# Patient Record
Sex: MALE | Race: WHITE | HISPANIC OR LATINO | Employment: FULL TIME | URBAN - METROPOLITAN AREA
[De-identification: names, ages, dates, MRNs, and addresses within clinical notes are randomized per-mention and may not be internally consistent; named-entity substitution may affect disease eponyms.]

---

## 2020-07-21 ENCOUNTER — TELEPHONE (OUTPATIENT)
Dept: RHEUMATOLOGY | Facility: CLINIC | Age: 60
End: 2020-07-21

## 2020-07-21 NOTE — TELEPHONE ENCOUNTER
Called patient back and he wanted a appointment as a new patient to see Dr. Barron as soon as possible, I voiced to him that we would need a referral from PCP but at this time Dr. Barron is out on a personal emergency and if he need to be seen soon then he can get a referral sent to another rheumatologist. Patient voiced understanding.

## 2021-12-23 ENCOUNTER — OFFICE VISIT (OUTPATIENT)
Dept: RHEUMATOLOGY | Facility: CLINIC | Age: 61
End: 2021-12-23
Payer: COMMERCIAL

## 2021-12-23 VITALS
SYSTOLIC BLOOD PRESSURE: 155 MMHG | BODY MASS INDEX: 23.56 KG/M2 | DIASTOLIC BLOOD PRESSURE: 86 MMHG | HEIGHT: 64 IN | WEIGHT: 138 LBS | RESPIRATION RATE: 16 BRPM | OXYGEN SATURATION: 98 % | HEART RATE: 74 BPM

## 2021-12-23 DIAGNOSIS — K90.0 CELIAC DISEASE: ICD-10-CM

## 2021-12-23 DIAGNOSIS — Z11.59 ENCOUNTER FOR HEPATITIS C SCREENING TEST FOR LOW RISK PATIENT: ICD-10-CM

## 2021-12-23 DIAGNOSIS — Z11.1 SCREENING-PULMONARY TB: ICD-10-CM

## 2021-12-23 DIAGNOSIS — G56.02 CARPAL TUNNEL SYNDROME OF LEFT WRIST: ICD-10-CM

## 2021-12-23 DIAGNOSIS — M79.10 MYALGIA: ICD-10-CM

## 2021-12-23 DIAGNOSIS — M15.8 OTHER OSTEOARTHRITIS INVOLVING MULTIPLE JOINTS: ICD-10-CM

## 2021-12-23 DIAGNOSIS — Z13.89 SCREENING FOR RHEUMATIC DISORDER: ICD-10-CM

## 2021-12-23 DIAGNOSIS — M25.50 POLYARTHRALGIA: Primary | ICD-10-CM

## 2021-12-23 DIAGNOSIS — M51.36 LUMBAR DEGENERATIVE DISC DISEASE: ICD-10-CM

## 2021-12-23 PROCEDURE — 3077F PR MOST RECENT SYSTOLIC BLOOD PRESSURE >= 140 MM HG: ICD-10-PCS | Mod: CPTII,S$GLB,, | Performed by: INTERNAL MEDICINE

## 2021-12-23 PROCEDURE — 4010F PR ACE/ARB THEARPY RXD/TAKEN: ICD-10-PCS | Mod: CPTII,S$GLB,, | Performed by: INTERNAL MEDICINE

## 2021-12-23 PROCEDURE — 1159F PR MEDICATION LIST DOCUMENTED IN MEDICAL RECORD: ICD-10-PCS | Mod: CPTII,S$GLB,, | Performed by: INTERNAL MEDICINE

## 2021-12-23 PROCEDURE — 3008F BODY MASS INDEX DOCD: CPT | Mod: CPTII,S$GLB,, | Performed by: INTERNAL MEDICINE

## 2021-12-23 PROCEDURE — 99204 PR OFFICE/OUTPT VISIT, NEW, LEVL IV, 45-59 MIN: ICD-10-PCS | Mod: S$GLB,,, | Performed by: INTERNAL MEDICINE

## 2021-12-23 PROCEDURE — 1160F RVW MEDS BY RX/DR IN RCRD: CPT | Mod: CPTII,S$GLB,, | Performed by: INTERNAL MEDICINE

## 2021-12-23 PROCEDURE — 3079F PR MOST RECENT DIASTOLIC BLOOD PRESSURE 80-89 MM HG: ICD-10-PCS | Mod: CPTII,S$GLB,, | Performed by: INTERNAL MEDICINE

## 2021-12-23 PROCEDURE — 3079F DIAST BP 80-89 MM HG: CPT | Mod: CPTII,S$GLB,, | Performed by: INTERNAL MEDICINE

## 2021-12-23 PROCEDURE — 3008F PR BODY MASS INDEX (BMI) DOCUMENTED: ICD-10-PCS | Mod: CPTII,S$GLB,, | Performed by: INTERNAL MEDICINE

## 2021-12-23 PROCEDURE — 1159F MED LIST DOCD IN RCRD: CPT | Mod: CPTII,S$GLB,, | Performed by: INTERNAL MEDICINE

## 2021-12-23 PROCEDURE — 3077F SYST BP >= 140 MM HG: CPT | Mod: CPTII,S$GLB,, | Performed by: INTERNAL MEDICINE

## 2021-12-23 PROCEDURE — 1160F PR REVIEW ALL MEDS BY PRESCRIBER/CLIN PHARMACIST DOCUMENTED: ICD-10-PCS | Mod: CPTII,S$GLB,, | Performed by: INTERNAL MEDICINE

## 2021-12-23 PROCEDURE — 99204 OFFICE O/P NEW MOD 45 MIN: CPT | Mod: S$GLB,,, | Performed by: INTERNAL MEDICINE

## 2021-12-23 PROCEDURE — 4010F ACE/ARB THERAPY RXD/TAKEN: CPT | Mod: CPTII,S$GLB,, | Performed by: INTERNAL MEDICINE

## 2021-12-23 RX ORDER — OMEPRAZOLE 40 MG/1
40 CAPSULE, DELAYED RELEASE ORAL DAILY
COMMUNITY

## 2021-12-23 RX ORDER — METHOCARBAMOL 500 MG/1
TABLET, FILM COATED ORAL
COMMUNITY
Start: 2021-12-13

## 2021-12-23 RX ORDER — DICLOFENAC SODIUM 10 MG/G
GEL TOPICAL
COMMUNITY
Start: 2021-12-06

## 2021-12-23 RX ORDER — ROPINIROLE 0.5 MG/1
TABLET, FILM COATED ORAL
COMMUNITY

## 2021-12-23 RX ORDER — MECLIZINE HYDROCHLORIDE 25 MG/1
TABLET ORAL
COMMUNITY
Start: 2021-07-02

## 2021-12-23 RX ORDER — NAPROXEN 500 MG/1
500 TABLET ORAL 2 TIMES DAILY PRN
Qty: 60 TABLET | Refills: 2 | Status: SHIPPED | OUTPATIENT
Start: 2021-12-23 | End: 2022-03-30 | Stop reason: SDUPTHER

## 2021-12-23 RX ORDER — NAPROXEN 500 MG/1
TABLET ORAL
COMMUNITY
Start: 2021-06-29 | End: 2021-12-23 | Stop reason: SDUPTHER

## 2021-12-23 RX ORDER — AMLODIPINE BESYLATE 10 MG/1
TABLET ORAL
COMMUNITY
Start: 2021-07-02

## 2021-12-23 RX ORDER — TRAMADOL HYDROCHLORIDE 50 MG/1
100 TABLET ORAL 3 TIMES DAILY PRN
Qty: 90 TABLET | Refills: 1 | Status: SHIPPED | OUTPATIENT
Start: 2021-12-23 | End: 2022-01-25 | Stop reason: SDUPTHER

## 2021-12-23 RX ORDER — LISINOPRIL 40 MG/1
TABLET ORAL
COMMUNITY
Start: 2021-11-08

## 2021-12-23 RX ORDER — PRAVASTATIN SODIUM 20 MG/1
TABLET ORAL
COMMUNITY
Start: 2021-11-08 | End: 2023-04-25 | Stop reason: ALTCHOICE

## 2022-01-11 NOTE — PROGRESS NOTES
Subjective:      Patient ID: Kayode Merrill is a 62 y.o. male.    Chief Complaint: Disease Management    HPI:   Includes joint pain with subjective swelling.   Antecedent event includes: None;  Pain location includes: joints;  Gradual onset; beginning >years ago;  Constant ache, Moderate in severity,   Lasting >hours, Worse during the daytime;   Improved with rest, medication, change in position and none;   Worsened with activity and overuse;     Review of Systems   Constitutional: Positive for fatigue. Negative for chills and fever.   HENT: Positive for mouth dryness and tinnitus. Negative for nasal congestion, ear pain, hearing loss, mouth sores, nosebleeds and trouble swallowing.    Eyes: Positive for visual disturbance. Negative for photophobia, pain, redness and eye dryness.   Respiratory: Positive for shortness of breath. Negative for cough.    Cardiovascular: Positive for chest pain, palpitations and leg swelling.   Gastrointestinal: Negative for abdominal distention, abdominal pain, blood in stool, constipation, diarrhea, rectal pain, vomiting and fecal incontinence.   Endocrine: Negative for polydipsia and polyuria.   Genitourinary: Negative for bladder incontinence, dysuria, enuresis, genital sores and hematuria.   Musculoskeletal: Positive for arthralgias, joint swelling and myalgias.   Integumentary:  Positive for rash. Negative for wound and mole/lesion.   Neurological: Positive for dizziness, weakness and headaches.   Hematological: Negative for adenopathy. Does not bruise/bleed easily.   Psychiatric/Behavioral: Positive for agitation, dysphoric mood and sleep disturbance. The patient is nervous/anxious.       Past Medical History:   Diagnosis Date    Acid reflux     Arthritis     Carpal tunnel syndrome     Celiac disease     Dry mouth     Heart murmur     Hyperlipidemia     Hypertension      Past Surgical History:   Procedure Laterality Date    COLONOSCOPY      EGD, WITH BALLOON DILATION    "   MOUTH SURGERY      ROTATOR CUFF REPAIR      TRIGGER FINGER RELEASE        See the Assessment/Plan for further characterization of the HPI, ROS, Medical, Surgical, Family, and Social Histories including Tobacco use, Meds; all of which has been reviewed in Epic.    Medication List with Changes/Refills   Current Medications    AMLODIPINE (NORVASC) 10 MG TABLET        DICLOFENAC SODIUM (VOLTAREN) 1 % GEL        LISINOPRIL (PRINIVIL,ZESTRIL) 40 MG TABLET        MECLIZINE (ANTIVERT) 25 MG TABLET        METHOCARBAMOL (ROBAXIN) 500 MG TAB        NAPROXEN (NAPROSYN) 500 MG TABLET    Take 1 tablet (500 mg total) by mouth 2 (two) times daily as needed (pain).    OMEPRAZOLE (PRILOSEC) 40 MG CAPSULE    Take 40 mg by mouth once daily.    PRAVASTATIN (PRAVACHOL) 20 MG TABLET        ROPINIROLE (REQUIP) 0.5 MG TABLET    ropinirole 0.5 mg tablet   Take 1 tablet 3 times a day by oral route.   Changed and/or Refilled Medications    Modified Medication Previous Medication    TRAMADOL (ULTRAM) 50 MG TABLET traMADoL (ULTRAM) 50 mg tablet       Take 2 tablets (100 mg total) by mouth 3 (three) times daily as needed for Pain (instead of oxycodone).    Take 2 tablets (100 mg total) by mouth 3 (three) times daily as needed for Pain (instead of oxycodone).         Objective:   /88   Pulse 63   Resp 16   Ht 5' 4" (1.626 m)   Wt 60.3 kg (133 lb)   SpO2 98%   BMI 22.83 kg/m²   Physical Exam  Non-toxic appearance. No distress.   Normocephalic and atraumatic.   External ears normal.    Conjunctivae and EOM are normal. No scleral icterus.   RRR, No friction rub; palpable distal pulses.    No tachypnea or signs of respiratory distress.   Neurological: Oriented. Normal thought content. No cranial nerve deficit. Strength is grossly normal without focal deficit.  Skin is warm. No pallor.   Musculoskeletal: see below for further input. No overt synovitis.     No image results found.    No visits with results within 1 Year(s) from this " visit.   Latest known visit with results is:   No results found for any previous visit.        All of the data above and below has been reviewed by myself and any further interpretations will be reflected in the assessment and plan.   The data includes review of external notes, and independent interpretation of lab results, x-rays, and imaging reports.  Active inflammatory arthritis is an illness that poses a threat to bodily function and even a threat to life in some cases.  Drug therapy to treat inflammatory arthritis usually requires intensive monitoring for toxicity.    Review of patient's allergies indicates:  No Known Allergies  Assessment/Plan:       Prev noted/prior plan:   (No overt synovitis  DJD generalized is impressive on exam  Heart murmur     Verbal education including long discussion of what is causing his pain, what else we are looking into, and that there won't be a medicine that will mask the pain any more than the prior narcotics oxycodone 10mg tid he had been on     He should consider also working with Pain Management if needing pain management with high dose narcotics     He tells me he had to wait to long in waiting room to see Dr. Krueger is why he left     I can resume tramadol 100mg tid prn moderate pain for now     He should also resume  Naprosyn 500mg bid prn unless there a reason he cannot use?     Blood work to further evaluate     Revisit 4 weeks     Contact us prn)    This visit:    Interim lab 1/13/22:        C3 97  C4 18    SPEP alpha 2 little low 0.52  Ig levels normal    WBC 4.8; Hgb 15.4; plt 324    Creat 0.74; alb 4.6    ESR 5 CRP <3    GEORGE neg RF neg CCP neg HLAB27 neg HepBsAgsAbnegcoreAb neg HCV neg uric acid 7    2022 TB negative      tramadol 100mg tid prn moderate pain for now     He should also resume  Naprosyn 500mg bid prn unless there a reason he cannot use? Prev noted    He has interim symptoms of inflammatory arthritis    I see that he also got some interim  hydrocodone noted    He may be working with a different Orthopedist now;   He tells me he had declined further management including possible knee surgery with Dr. Diego, but     since working with a new doctor    He had a left shoulder injection and may have a carpal tunnel injection in the future    No overt synovitis  DJD impressive including hands and knees   Rotator cuff    Went over lab including RF neg    I agree with most of Dr. Krueger's impression    I am not seeing active inflammatory arthritis or connective tissue disease on exam or lab noted    Verbal education    We talked and mostly listened for while today; I feel for him and I am impressed he has been able to wean the narcotics    He does have a lot of Osteoarthritis; I don't see active inflammatory arthritis    I am okay with giving him the tramadol with caveats    tramadol 100mg tid prn moderate pain for now    It sounds like he is also working with Orthopedics;   He is not sure but the doctor did give him a shoulder injection noted    xrays to further evaluate    Revisit 2 months with lab 2 weeks prior    Contact us prn       GEORGE neg CCP neg  GEORGE neg RF neg CCP neg HLAB27 neg HepBsAgsAbnegcoreAb neg HCV neg uric acid 7 2022 TB negative    There is some history to suggest inflammatory arthritis.     Celiac disease per records from PCP     There is also some mechanical and neurogenic pain.     Recently found to have left Carpal tunnel on EMG/NCS 12/13/21 he brought; he can update Orthopedist if not already     Right ring finger surgery  Right shoulder surgery Dr. Diego     Severe DJD in knees left worse than right     Has been managing with:     Naprosyn 500mg bid he tells me he rather does not use     Robaxin he tells me he rather does not use and plan to clarify his med list with Tori     Voltaren gel     Prior oxycodone 10mg tid that he was still getting up until a couple of months ago    Prev noted/prior plan:   (No overt synovitis  DJD  generalized is impressive on exam  Heart murmur     Verbal education including long discussion of what is causing his pain, what else we are looking into, and that there won't be a medicine that will mask the pain any more than the prior narcotics oxycodone 10mg tid he had been on     He should consider also working with Pain Management if needing pain management with high dose narcotics     He tells me he had to wait to long in waiting room to see Dr. Krueger is why he left     I can resume tramadol 100mg tid prn moderate pain for now     He should also resume  Naprosyn 500mg bid prn unless there a reason he cannot use?     Blood work to further evaluate     Revisit 4 weeks     Contact us prn)     Has been working with:     Celiac disease followed by GI     Aortic stenosis Dr. [facility]      Records Dr. Krueger some of note 5/26/21: OA shoulder; lumbar spine pain; failed NSAIDs; Opiates help ADL; improvement with steroid injection; A/P: RF+ mild; does not suggest active rheumatoid arthritis; OA; DJD hips, hands, spine; Oxycodone 10mg tid; Lab; revisit 1 month     8/6/21 xray reports: right shoulder severe DJD post op changes; severe DJD left knee worse than right 3 compartment       7/31/20 CT abd/pelvis: mild fatty infiltration liver; cysts versus hemangiomas 1.1 cm left hepatic lobe; thick sigmoid colon may be secondary to prior colitis; recommend MRI multiphase to further evaluate     Review of medical records as stated above.  Lab +/- imaging and other ordered diagnostic studies to further evaluate.  See the orders associated with this note visit.  Medications as prescribed as tolerated.    Education including verbal and those noted in the patient instructions.  Revisit as scheduled and call prn.    The following diagnoses influence medical decision making and/or need further workup including the orders listed below:    Polyarthralgia  -     CBC Auto Differential; Future; Expected date: 03/07/2022  -      CK; Future; Expected date: 03/07/2022  -     Comprehensive Metabolic Panel; Future; Expected date: 03/07/2022  -     C-Reactive Protein; Future; Expected date: 03/07/2022  -     Sedimentation rate; Future; Expected date: 03/07/2022  -     Urinalysis; Future; Expected date: 03/07/2022  -     X-Ray Hand 3 View Bilateral; Future; Expected date: 01/25/2022  -     X-Ray Hips Bilateral 2 View Inc AP Pelvis; Future; Expected date: 01/25/2022  -     X-Ray Chest PA And Lateral; Future; Expected date: 01/25/2022  -     X-Ray Cervical Spine AP And Lateral; Future; Expected date: 01/25/2022  -     X-ray AP Standing Knees with Both Latera; Future; Expected date: 01/25/2022  -     X-Ray Foot 2 View Bilateral; Future; Expected date: 01/25/2022  -     X-Ray Lumbar Spine AP And Lateral; Future; Expected date: 01/25/2022  -     traMADoL (ULTRAM) 50 mg tablet; Take 2 tablets (100 mg total) by mouth 3 (three) times daily as needed for Pain (instead of oxycodone).  Dispense: 180 tablet; Refill: 1    Other osteoarthritis involving multiple joints  -     CBC Auto Differential; Future; Expected date: 03/07/2022  -     CK; Future; Expected date: 03/07/2022  -     Comprehensive Metabolic Panel; Future; Expected date: 03/07/2022  -     C-Reactive Protein; Future; Expected date: 03/07/2022  -     Sedimentation rate; Future; Expected date: 03/07/2022  -     Urinalysis; Future; Expected date: 03/07/2022  -     X-Ray Hand 3 View Bilateral; Future; Expected date: 01/25/2022  -     X-Ray Hips Bilateral 2 View Inc AP Pelvis; Future; Expected date: 01/25/2022  -     X-Ray Chest PA And Lateral; Future; Expected date: 01/25/2022  -     X-Ray Cervical Spine AP And Lateral; Future; Expected date: 01/25/2022  -     X-ray AP Standing Knees with Both Latera; Future; Expected date: 01/25/2022  -     X-Ray Foot 2 View Bilateral; Future; Expected date: 01/25/2022  -     X-Ray Lumbar Spine AP And Lateral; Future; Expected date: 01/25/2022  -     traMADoL  (ULTRAM) 50 mg tablet; Take 2 tablets (100 mg total) by mouth 3 (three) times daily as needed for Pain (instead of oxycodone).  Dispense: 180 tablet; Refill: 1    Lumbar degenerative disc disease  -     CBC Auto Differential; Future; Expected date: 03/07/2022  -     CK; Future; Expected date: 03/07/2022  -     Comprehensive Metabolic Panel; Future; Expected date: 03/07/2022  -     C-Reactive Protein; Future; Expected date: 03/07/2022  -     Sedimentation rate; Future; Expected date: 03/07/2022  -     Urinalysis; Future; Expected date: 03/07/2022  -     X-Ray Lumbar Spine AP And Lateral; Future; Expected date: 01/25/2022  -     traMADoL (ULTRAM) 50 mg tablet; Take 2 tablets (100 mg total) by mouth 3 (three) times daily as needed for Pain (instead of oxycodone).  Dispense: 180 tablet; Refill: 1    Celiac disease  -     CBC Auto Differential; Future; Expected date: 03/07/2022  -     CK; Future; Expected date: 03/07/2022  -     Comprehensive Metabolic Panel; Future; Expected date: 03/07/2022  -     C-Reactive Protein; Future; Expected date: 03/07/2022  -     Sedimentation rate; Future; Expected date: 03/07/2022  -     Urinalysis; Future; Expected date: 03/07/2022    Myalgia  -     CBC Auto Differential; Future; Expected date: 03/07/2022  -     CK; Future; Expected date: 03/07/2022  -     Comprehensive Metabolic Panel; Future; Expected date: 03/07/2022  -     C-Reactive Protein; Future; Expected date: 03/07/2022  -     Sedimentation rate; Future; Expected date: 03/07/2022  -     Urinalysis; Future; Expected date: 03/07/2022    Screening for rheumatic disorder  -     CBC Auto Differential; Future; Expected date: 03/07/2022  -     CK; Future; Expected date: 03/07/2022  -     Comprehensive Metabolic Panel; Future; Expected date: 03/07/2022  -     C-Reactive Protein; Future; Expected date: 03/07/2022  -     Sedimentation rate; Future; Expected date: 03/07/2022  -     Urinalysis; Future; Expected date: 03/07/2022  -     X-Ray  Hand 3 View Bilateral; Future; Expected date: 01/25/2022  -     X-Ray Hips Bilateral 2 View Inc AP Pelvis; Future; Expected date: 01/25/2022  -     X-Ray Chest PA And Lateral; Future; Expected date: 01/25/2022  -     X-Ray Cervical Spine AP And Lateral; Future; Expected date: 01/25/2022  -     X-ray AP Standing Knees with Both Latera; Future; Expected date: 01/25/2022  -     X-Ray Foot 2 View Bilateral; Future; Expected date: 01/25/2022  -     X-Ray Lumbar Spine AP And Lateral; Future; Expected date: 01/25/2022    Screening-pulmonary TB  -     CBC Auto Differential; Future; Expected date: 03/07/2022  -     CK; Future; Expected date: 03/07/2022  -     Comprehensive Metabolic Panel; Future; Expected date: 03/07/2022  -     C-Reactive Protein; Future; Expected date: 03/07/2022  -     Sedimentation rate; Future; Expected date: 03/07/2022  -     Urinalysis; Future; Expected date: 03/07/2022  -     X-Ray Chest PA And Lateral; Future; Expected date: 01/25/2022      Follow up in about 2 months (around 3/25/2022).     Graham Zheng MD

## 2022-01-25 ENCOUNTER — OFFICE VISIT (OUTPATIENT)
Dept: RHEUMATOLOGY | Facility: CLINIC | Age: 62
End: 2022-01-25
Payer: COMMERCIAL

## 2022-01-25 VITALS
OXYGEN SATURATION: 98 % | BODY MASS INDEX: 22.71 KG/M2 | DIASTOLIC BLOOD PRESSURE: 88 MMHG | HEIGHT: 64 IN | SYSTOLIC BLOOD PRESSURE: 124 MMHG | WEIGHT: 133 LBS | HEART RATE: 63 BPM | RESPIRATION RATE: 16 BRPM

## 2022-01-25 DIAGNOSIS — M51.36 LUMBAR DEGENERATIVE DISC DISEASE: ICD-10-CM

## 2022-01-25 DIAGNOSIS — Z11.1 SCREENING-PULMONARY TB: ICD-10-CM

## 2022-01-25 DIAGNOSIS — K90.0 CELIAC DISEASE: ICD-10-CM

## 2022-01-25 DIAGNOSIS — Z13.89 SCREENING FOR RHEUMATIC DISORDER: ICD-10-CM

## 2022-01-25 DIAGNOSIS — M15.8 OTHER OSTEOARTHRITIS INVOLVING MULTIPLE JOINTS: ICD-10-CM

## 2022-01-25 DIAGNOSIS — M79.10 MYALGIA: ICD-10-CM

## 2022-01-25 DIAGNOSIS — M25.50 POLYARTHRALGIA: Primary | ICD-10-CM

## 2022-01-25 PROCEDURE — 1160F PR REVIEW ALL MEDS BY PRESCRIBER/CLIN PHARMACIST DOCUMENTED: ICD-10-PCS | Mod: CPTII,S$GLB,, | Performed by: INTERNAL MEDICINE

## 2022-01-25 PROCEDURE — 3074F SYST BP LT 130 MM HG: CPT | Mod: CPTII,S$GLB,, | Performed by: INTERNAL MEDICINE

## 2022-01-25 PROCEDURE — 99214 PR OFFICE/OUTPT VISIT, EST, LEVL IV, 30-39 MIN: ICD-10-PCS | Mod: S$GLB,,, | Performed by: INTERNAL MEDICINE

## 2022-01-25 PROCEDURE — 3008F PR BODY MASS INDEX (BMI) DOCUMENTED: ICD-10-PCS | Mod: CPTII,S$GLB,, | Performed by: INTERNAL MEDICINE

## 2022-01-25 PROCEDURE — 3008F BODY MASS INDEX DOCD: CPT | Mod: CPTII,S$GLB,, | Performed by: INTERNAL MEDICINE

## 2022-01-25 PROCEDURE — 3079F PR MOST RECENT DIASTOLIC BLOOD PRESSURE 80-89 MM HG: ICD-10-PCS | Mod: CPTII,S$GLB,, | Performed by: INTERNAL MEDICINE

## 2022-01-25 PROCEDURE — 1159F PR MEDICATION LIST DOCUMENTED IN MEDICAL RECORD: ICD-10-PCS | Mod: CPTII,S$GLB,, | Performed by: INTERNAL MEDICINE

## 2022-01-25 PROCEDURE — 99214 OFFICE O/P EST MOD 30 MIN: CPT | Mod: S$GLB,,, | Performed by: INTERNAL MEDICINE

## 2022-01-25 PROCEDURE — 1160F RVW MEDS BY RX/DR IN RCRD: CPT | Mod: CPTII,S$GLB,, | Performed by: INTERNAL MEDICINE

## 2022-01-25 PROCEDURE — 3079F DIAST BP 80-89 MM HG: CPT | Mod: CPTII,S$GLB,, | Performed by: INTERNAL MEDICINE

## 2022-01-25 PROCEDURE — 3074F PR MOST RECENT SYSTOLIC BLOOD PRESSURE < 130 MM HG: ICD-10-PCS | Mod: CPTII,S$GLB,, | Performed by: INTERNAL MEDICINE

## 2022-01-25 PROCEDURE — 1159F MED LIST DOCD IN RCRD: CPT | Mod: CPTII,S$GLB,, | Performed by: INTERNAL MEDICINE

## 2022-01-25 RX ORDER — TRAMADOL HYDROCHLORIDE 50 MG/1
100 TABLET ORAL 3 TIMES DAILY PRN
Qty: 180 TABLET | Refills: 1 | Status: SHIPPED | OUTPATIENT
Start: 2022-01-25 | End: 2022-03-31 | Stop reason: SDUPTHER

## 2022-03-17 NOTE — PROGRESS NOTES
Subjective:      Patient ID: Kayode Merrill is a 62 y.o. male.    Chief Complaint: Disease Management    HPI:   Includes joint pain with subjective swelling.   Antecedent event includes: None;  Pain location includes: left shoulder, left hand and right feet;  Gradual onset; beginning >years ago;  Constant ache, dull, sharp, burning and numbness, Moderate in severity,   Lasting >hours, Worse at night time;   Improved with change in position;   Worsened with overuse;     Review of Systems   Constitutional: Positive for fatigue. Negative for chills and fever.   HENT: Positive for hearing loss. Negative for nasal congestion, ear pain, mouth dryness, mouth sores, nosebleeds and trouble swallowing.    Eyes: Negative for photophobia, pain, redness, visual disturbance and eye dryness.   Respiratory: Positive for shortness of breath. Negative for cough.    Cardiovascular: Positive for palpitations. Negative for chest pain and leg swelling.   Gastrointestinal: Negative for abdominal distention, abdominal pain, blood in stool, constipation, diarrhea, rectal pain, vomiting and fecal incontinence.   Endocrine: Negative for polydipsia and polyuria.   Genitourinary: Negative for bladder incontinence, dysuria, enuresis, genital sores and hematuria.   Musculoskeletal: Positive for arthralgias, joint swelling and myalgias.   Integumentary:  Positive for rash. Negative for wound and mole/lesion.   Neurological: Positive for dizziness, weakness, numbness, headaches and memory loss.   Hematological: Negative for adenopathy. Does not bruise/bleed easily.   Psychiatric/Behavioral: Positive for dysphoric mood and sleep disturbance. The patient is nervous/anxious.       Past Medical History:   Diagnosis Date    Acid reflux     Arthritis     Carpal tunnel syndrome     Celiac disease     Dry mouth     Heart murmur     Hyperlipidemia     Hypertension      Past Surgical History:   Procedure Laterality Date    COLONOSCOPY      EGD,  "WITH BALLOON DILATION      MOUTH SURGERY      ROTATOR CUFF REPAIR      TRIGGER FINGER RELEASE        See the Assessment/Plan for further characterization of the HPI, ROS, Medical, Surgical, Family, and Social Histories including Tobacco use, Meds; all of which has been reviewed in Epic.    Medication List with Changes/Refills   Current Medications    AMLODIPINE (NORVASC) 10 MG TABLET        DICLOFENAC SODIUM (VOLTAREN) 1 % GEL        LISINOPRIL (PRINIVIL,ZESTRIL) 40 MG TABLET        MECLIZINE (ANTIVERT) 25 MG TABLET        METHOCARBAMOL (ROBAXIN) 500 MG TAB        OMEPRAZOLE (PRILOSEC) 40 MG CAPSULE    Take 40 mg by mouth once daily.    PRAVASTATIN (PRAVACHOL) 20 MG TABLET        ROPINIROLE (REQUIP) 0.5 MG TABLET    ropinirole 0.5 mg tablet   Take 1 tablet 3 times a day by oral route.   Changed and/or Refilled Medications    Modified Medication Previous Medication    NAPROXEN (NAPROSYN) 500 MG TABLET naproxen (NAPROSYN) 500 MG tablet       Take 1 tablet (500 mg total) by mouth 2 (two) times daily as needed (pain).    Take 1 tablet (500 mg total) by mouth 2 (two) times daily as needed (pain).    TRAMADOL (ULTRAM) 50 MG TABLET traMADoL (ULTRAM) 50 mg tablet       Take 2 tablets (100 mg total) by mouth 3 (three) times daily as needed for Pain (instead of oxycodone).    Take 2 tablets (100 mg total) by mouth 3 (three) times daily as needed for Pain (instead of oxycodone).         Objective:   BP (!) 144/88   Pulse 82   Resp 16   Ht 5' 4" (1.626 m)   Wt 60.3 kg (133 lb)   SpO2 98%   BMI 22.83 kg/m²   Physical Exam  Non-toxic appearance. No distress.   Normocephalic and atraumatic.   External ears normal.    Conjunctivae and EOM are normal. No scleral icterus.   RRR, No friction rub; palpable distal pulses.    No tachypnea or signs of respiratory distress. CTAB.  Abdominal: No guarding or rebound tenderness.   Neurological: Oriented. Normal thought content. No cranial nerve deficit. Strength is grossly normal " without focal deficit.  Skin is warm. No pallor.   Musculoskeletal: DJD. see below for further input. No overt synovitis.     No image results found.    No visits with results within 1 Year(s) from this visit.   Latest known visit with results is:   No results found for any previous visit.        All of the data above and below has been reviewed by myself and any further interpretations will be reflected in the assessment and plan.   The data includes review of external notes, and independent interpretation of lab results, x-rays, and imaging reports.  Active inflammatory arthritis is an illness that poses a threat to bodily function and even a threat to life in some cases.  Drug therapy to treat inflammatory arthritis usually requires intensive monitoring for toxicity.    Review of patient's allergies indicates:  No Known Allergies  Assessment/Plan:       Prev noted/prior plan:   (No overt synovitis  DJD generalized is impressive on exam  Heart murmur     Verbal education including long discussion of what is causing his pain, what else we are looking into, and that there won't be a medicine that will mask the pain any more than the prior narcotics oxycodone 10mg tid he had been on     He should consider also working with Pain Management if needing pain management with high dose narcotics     He tells me he had to wait to long in waiting room to see Dr. Krueger is why he left     I can resume tramadol 100mg tid prn moderate pain for now     He should also resume  Naprosyn 500mg bid prn unless there a reason he cannot use?     Blood work to further evaluate     Revisit 4 weeks     Contact us prn)     Last visit:     Interim lab 1/13/22:          C3 97  C4 18     SPEP alpha 2 little low 0.52  Ig levels normal     WBC 4.8; Hgb 15.4; plt 324     Creat 0.74; alb 4.6     ESR 5 CRP <3     GEORGE neg RF neg CCP neg HLAB27 neg HepBsAgsAbnegcoreAb neg HCV neg uric acid 7     2022 TB negative        tramadol 100mg tid prn  moderate pain for now     He should also resume  Naprosyn 500mg bid prn unless there a reason he cannot use? Prev noted     He has interim symptoms of inflammatory arthritis     I see that he also got some interim hydrocodone noted     He may be working with a different Orthopedist now;   He tells me he had declined further management including possible knee surgery with Dr. Diego, but      since working with a new doctor     He had a left shoulder injection and may have a carpal tunnel injection in the future     No overt synovitis  DJD impressive including hands and knees   Rotator cuff     Went over lab including RF neg     I agree with most of Dr. Krueger's impression     I am not seeing active inflammatory arthritis or connective tissue disease on exam or lab noted     Verbal education     We talked and mostly listened for while today; I feel for him and I am impressed he has been able to wean the narcotics     He does have a lot of Osteoarthritis; I don't see active inflammatory arthritis     I am okay with giving him the tramadol with caveats     tramadol 100mg tid prn moderate pain for now     It sounds like he is also working with Orthopedics;   He is not sure but the doctor did give him a shoulder injection noted     xrays to further evaluate     Revisit 2 months with lab 2 weeks prior     Contact us prn     This visit:    Interim lab 3/15/22:    Creat 0.64; alb 4.8    WBC 4.8; Hgb 15; plt 327    UA SG 1.015 rare cell trace ketones      ESR<1; CRP <3      Interim xray reports: he held off due to cost but should proceed in future when able    Naprosyn 500mg bid prn     tramadol 100mg tid prn moderate pain for now    He has some interim symptoms inflammatory arthritis also symptoms of carpal tunnel    He tells me he has also been working with Orthopedics and a hand specialist and may have left shoulder surgery in the future    He also had some xrays with them and he will plan to have them forward to  us and we will seek if not sent next visit  He should also get the xrays at his convenience    He has made some dietary changes trying to be more gluten free which is great    He tells me he also went to ED interim for pulled muscle in stomach after lifting something heavy that is better noted    No overt synovitis  DJD is impressive in hands on exam  Rotator cuff    Went over interim lab     I am not seeing active inflammatory arthritis or connective tissue disease on exam or lab noted.    We will observe otherwise and he will plan to call if joint swelling recurs;     Xray when able and send us other    Refilled naprosyn and tramadol    Revisit 6 months with lab 2 weeks prior      Contact us prn       GEORGE neg CCP neg  GEORGE neg RF neg CCP neg HLAB27 neg HepBsAgsAbnegcoreAb neg HCV neg uric acid 7 2022 TB negative     There is some history to suggest inflammatory arthritis.     Celiac disease per records from PCP     There is also some mechanical and neurogenic pain.     Recently found to have left Carpal tunnel on EMG/NCS 12/13/21 he brought; he can update Orthopedist if not already     Right ring finger surgery  Right shoulder surgery Dr. Diego     Severe DJD in knees left worse than right     Has been managing with:     Naprosyn 500mg bid he tells me he rather does not use     Robaxin he tells me he rather does not use and plan to clarify his med list with Tori     Voltaren gel     Prior oxycodone 10mg tid that he was still getting up until a couple of months ago     Prev noted/prior plan:   (No overt synovitis  DJD generalized is impressive on exam  Heart murmur     Verbal education including long discussion of what is causing his pain, what else we are looking into, and that there won't be a medicine that will mask the pain any more than the prior narcotics oxycodone 10mg tid he had been on     He should consider also working with Pain Management if needing pain management with high dose  narcotics     He tells me he had to wait to long in waiting room to see Dr. Krueger is why he left     I can resume tramadol 100mg tid prn moderate pain for now     He should also resume  Naprosyn 500mg bid prn unless there a reason he cannot use?     Blood work to further evaluate     Revisit 4 weeks     Contact us prn)     Has been working with:     Celiac disease followed by GI     Aortic stenosis Dr. [facility]      Records Dr. Krueger some of note 5/26/21: OA shoulder; lumbar spine pain; failed NSAIDs; Opiates help ADL; improvement with steroid injection; A/P: RF+ mild; does not suggest active rheumatoid arthritis; OA; DJD hips, hands, spine; Oxycodone 10mg tid; Lab; revisit 1 month     8/6/21 xray reports: right shoulder severe DJD post op changes; severe DJD left knee worse than right 3 compartment       7/31/20 CT abd/pelvis: mild fatty infiltration liver; cysts versus hemangiomas 1.1 cm left hepatic lobe; thick sigmoid colon may be secondary to prior colitis; recommend MRI multiphase to further evaluate     Review of medical records as stated above.  Lab +/- imaging and other ordered diagnostic studies to further evaluate.  See the orders associated with this note visit.  Medications as prescribed as tolerated.    Education including verbal and those noted in the patient instructions.  Revisit as scheduled and call prn.    The following diagnoses influence medical decision making and/or need further workup including the orders listed below:    Pain in other joint  -     CBC Auto Differential; Future; Expected date: 09/12/2022  -     CK; Future; Expected date: 09/12/2022  -     Comprehensive Metabolic Panel; Future; Expected date: 09/12/2022  -     C-Reactive Protein; Future; Expected date: 09/12/2022  -     Sedimentation rate; Future; Expected date: 09/12/2022  -     Urinalysis; Future; Expected date: 09/12/2022  -     traMADoL (ULTRAM) 50 mg tablet; Take 2 tablets (100 mg total) by mouth 3 (three)  times daily as needed for Pain (instead of oxycodone).  Dispense: 180 tablet; Refill: 1  -     naproxen (NAPROSYN) 500 MG tablet; Take 1 tablet (500 mg total) by mouth 2 (two) times daily as needed (pain).  Dispense: 60 tablet; Refill: 2    Polyarthralgia  -     CBC Auto Differential; Future; Expected date: 09/12/2022  -     CK; Future; Expected date: 09/12/2022  -     Comprehensive Metabolic Panel; Future; Expected date: 09/12/2022  -     C-Reactive Protein; Future; Expected date: 09/12/2022  -     Sedimentation rate; Future; Expected date: 09/12/2022  -     Urinalysis; Future; Expected date: 09/12/2022  -     traMADoL (ULTRAM) 50 mg tablet; Take 2 tablets (100 mg total) by mouth 3 (three) times daily as needed for Pain (instead of oxycodone).  Dispense: 180 tablet; Refill: 1  -     naproxen (NAPROSYN) 500 MG tablet; Take 1 tablet (500 mg total) by mouth 2 (two) times daily as needed (pain).  Dispense: 60 tablet; Refill: 2    Other osteoarthritis involving multiple joints  -     CBC Auto Differential; Future; Expected date: 09/12/2022  -     CK; Future; Expected date: 09/12/2022  -     Comprehensive Metabolic Panel; Future; Expected date: 09/12/2022  -     C-Reactive Protein; Future; Expected date: 09/12/2022  -     Sedimentation rate; Future; Expected date: 09/12/2022  -     Urinalysis; Future; Expected date: 09/12/2022  -     traMADoL (ULTRAM) 50 mg tablet; Take 2 tablets (100 mg total) by mouth 3 (three) times daily as needed for Pain (instead of oxycodone).  Dispense: 180 tablet; Refill: 1  -     naproxen (NAPROSYN) 500 MG tablet; Take 1 tablet (500 mg total) by mouth 2 (two) times daily as needed (pain).  Dispense: 60 tablet; Refill: 2    Lumbar degenerative disc disease  -     CBC Auto Differential; Future; Expected date: 09/12/2022  -     CK; Future; Expected date: 09/12/2022  -     Comprehensive Metabolic Panel; Future; Expected date: 09/12/2022  -     C-Reactive Protein; Future; Expected date:  09/12/2022  -     Sedimentation rate; Future; Expected date: 09/12/2022  -     Urinalysis; Future; Expected date: 09/12/2022  -     traMADoL (ULTRAM) 50 mg tablet; Take 2 tablets (100 mg total) by mouth 3 (three) times daily as needed for Pain (instead of oxycodone).  Dispense: 180 tablet; Refill: 1  -     naproxen (NAPROSYN) 500 MG tablet; Take 1 tablet (500 mg total) by mouth 2 (two) times daily as needed (pain).  Dispense: 60 tablet; Refill: 2    Celiac disease  -     CBC Auto Differential; Future; Expected date: 09/12/2022  -     CK; Future; Expected date: 09/12/2022  -     Comprehensive Metabolic Panel; Future; Expected date: 09/12/2022  -     C-Reactive Protein; Future; Expected date: 09/12/2022  -     Sedimentation rate; Future; Expected date: 09/12/2022  -     Urinalysis; Future; Expected date: 09/12/2022    Myalgia  -     CBC Auto Differential; Future; Expected date: 09/12/2022  -     CK; Future; Expected date: 09/12/2022  -     Comprehensive Metabolic Panel; Future; Expected date: 09/12/2022  -     C-Reactive Protein; Future; Expected date: 09/12/2022  -     Sedimentation rate; Future; Expected date: 09/12/2022  -     Urinalysis; Future; Expected date: 09/12/2022    Screening for rheumatic disorder  -     CBC Auto Differential; Future; Expected date: 09/12/2022  -     CK; Future; Expected date: 09/12/2022  -     Comprehensive Metabolic Panel; Future; Expected date: 09/12/2022  -     C-Reactive Protein; Future; Expected date: 09/12/2022  -     Sedimentation rate; Future; Expected date: 09/12/2022  -     Urinalysis; Future; Expected date: 09/12/2022    Carpal tunnel syndrome of left wrist  -     CBC Auto Differential; Future; Expected date: 09/12/2022  -     CK; Future; Expected date: 09/12/2022  -     Comprehensive Metabolic Panel; Future; Expected date: 09/12/2022  -     C-Reactive Protein; Future; Expected date: 09/12/2022  -     Sedimentation rate; Future; Expected date: 09/12/2022  -     Urinalysis;  Future; Expected date: 09/12/2022    Rotator cuff disorder, left      Follow up in about 6 months (around 9/30/2022).     Graham Zheng MD

## 2022-03-30 DIAGNOSIS — M15.8 OTHER OSTEOARTHRITIS INVOLVING MULTIPLE JOINTS: ICD-10-CM

## 2022-03-30 DIAGNOSIS — M51.36 LUMBAR DEGENERATIVE DISC DISEASE: ICD-10-CM

## 2022-03-30 DIAGNOSIS — M25.50 POLYARTHRALGIA: ICD-10-CM

## 2022-03-30 RX ORDER — NAPROXEN 500 MG/1
500 TABLET ORAL 2 TIMES DAILY PRN
Qty: 60 TABLET | Refills: 2 | Status: SHIPPED | OUTPATIENT
Start: 2022-03-30 | End: 2022-03-31 | Stop reason: SDUPTHER

## 2022-03-31 ENCOUNTER — OFFICE VISIT (OUTPATIENT)
Dept: RHEUMATOLOGY | Facility: CLINIC | Age: 62
End: 2022-03-31
Payer: COMMERCIAL

## 2022-03-31 VITALS
BODY MASS INDEX: 22.71 KG/M2 | OXYGEN SATURATION: 98 % | DIASTOLIC BLOOD PRESSURE: 88 MMHG | SYSTOLIC BLOOD PRESSURE: 144 MMHG | WEIGHT: 133 LBS | HEART RATE: 82 BPM | HEIGHT: 64 IN | RESPIRATION RATE: 16 BRPM

## 2022-03-31 DIAGNOSIS — M67.912 ROTATOR CUFF DISORDER, LEFT: ICD-10-CM

## 2022-03-31 DIAGNOSIS — M15.8 OTHER OSTEOARTHRITIS INVOLVING MULTIPLE JOINTS: ICD-10-CM

## 2022-03-31 DIAGNOSIS — M25.59 PAIN IN OTHER JOINT: Primary | ICD-10-CM

## 2022-03-31 DIAGNOSIS — M51.36 LUMBAR DEGENERATIVE DISC DISEASE: ICD-10-CM

## 2022-03-31 DIAGNOSIS — M79.10 MYALGIA: ICD-10-CM

## 2022-03-31 DIAGNOSIS — Z13.89 SCREENING FOR RHEUMATIC DISORDER: ICD-10-CM

## 2022-03-31 DIAGNOSIS — M25.50 POLYARTHRALGIA: ICD-10-CM

## 2022-03-31 DIAGNOSIS — G56.02 CARPAL TUNNEL SYNDROME OF LEFT WRIST: ICD-10-CM

## 2022-03-31 DIAGNOSIS — K90.0 CELIAC DISEASE: ICD-10-CM

## 2022-03-31 PROCEDURE — 3079F DIAST BP 80-89 MM HG: CPT | Mod: CPTII,S$GLB,, | Performed by: INTERNAL MEDICINE

## 2022-03-31 PROCEDURE — 1160F PR REVIEW ALL MEDS BY PRESCRIBER/CLIN PHARMACIST DOCUMENTED: ICD-10-PCS | Mod: CPTII,S$GLB,, | Performed by: INTERNAL MEDICINE

## 2022-03-31 PROCEDURE — 1159F PR MEDICATION LIST DOCUMENTED IN MEDICAL RECORD: ICD-10-PCS | Mod: CPTII,S$GLB,, | Performed by: INTERNAL MEDICINE

## 2022-03-31 PROCEDURE — 99214 OFFICE O/P EST MOD 30 MIN: CPT | Mod: S$GLB,,, | Performed by: INTERNAL MEDICINE

## 2022-03-31 PROCEDURE — 4010F ACE/ARB THERAPY RXD/TAKEN: CPT | Mod: CPTII,S$GLB,, | Performed by: INTERNAL MEDICINE

## 2022-03-31 PROCEDURE — 3077F PR MOST RECENT SYSTOLIC BLOOD PRESSURE >= 140 MM HG: ICD-10-PCS | Mod: CPTII,S$GLB,, | Performed by: INTERNAL MEDICINE

## 2022-03-31 PROCEDURE — 99214 PR OFFICE/OUTPT VISIT, EST, LEVL IV, 30-39 MIN: ICD-10-PCS | Mod: S$GLB,,, | Performed by: INTERNAL MEDICINE

## 2022-03-31 PROCEDURE — 3079F PR MOST RECENT DIASTOLIC BLOOD PRESSURE 80-89 MM HG: ICD-10-PCS | Mod: CPTII,S$GLB,, | Performed by: INTERNAL MEDICINE

## 2022-03-31 PROCEDURE — 1159F MED LIST DOCD IN RCRD: CPT | Mod: CPTII,S$GLB,, | Performed by: INTERNAL MEDICINE

## 2022-03-31 PROCEDURE — 3008F BODY MASS INDEX DOCD: CPT | Mod: CPTII,S$GLB,, | Performed by: INTERNAL MEDICINE

## 2022-03-31 PROCEDURE — 4010F PR ACE/ARB THEARPY RXD/TAKEN: ICD-10-PCS | Mod: CPTII,S$GLB,, | Performed by: INTERNAL MEDICINE

## 2022-03-31 PROCEDURE — 1160F RVW MEDS BY RX/DR IN RCRD: CPT | Mod: CPTII,S$GLB,, | Performed by: INTERNAL MEDICINE

## 2022-03-31 PROCEDURE — 3077F SYST BP >= 140 MM HG: CPT | Mod: CPTII,S$GLB,, | Performed by: INTERNAL MEDICINE

## 2022-03-31 PROCEDURE — 3008F PR BODY MASS INDEX (BMI) DOCUMENTED: ICD-10-PCS | Mod: CPTII,S$GLB,, | Performed by: INTERNAL MEDICINE

## 2022-03-31 RX ORDER — TRAMADOL HYDROCHLORIDE 50 MG/1
100 TABLET ORAL 3 TIMES DAILY PRN
Qty: 180 TABLET | Refills: 1 | Status: SHIPPED | OUTPATIENT
Start: 2022-03-31 | End: 2022-09-21 | Stop reason: SDUPTHER

## 2022-03-31 RX ORDER — NAPROXEN 500 MG/1
500 TABLET ORAL 2 TIMES DAILY PRN
Qty: 60 TABLET | Refills: 2 | Status: SHIPPED | OUTPATIENT
Start: 2022-03-31 | End: 2022-09-19 | Stop reason: SDUPTHER

## 2022-09-16 NOTE — PROGRESS NOTES
Subjective:      Patient ID: Kayode Merrill is a 62 y.o. male.    Chief Complaint: Disease Management    HPI:   Includes joint pain with subjective swelling.   Antecedent event includes: None;  Pain location includes: left shoulder, left hand, and left knee;  Gradual onset; beginning >years ago;  Constant ache, Moderate in severity,   Lasting >hours, Worse during the daytime;   Improved with rest, medication, change in position, and none;   Worsened with activity and overuse;         Review of Systems   Constitutional:  Positive for fatigue. Negative for fever and unexpected weight change.   HENT:  Negative for mouth dryness, mouth sores and trouble swallowing.    Eyes:  Negative for redness.   Respiratory:  Negative for cough and shortness of breath.    Cardiovascular:  Positive for palpitations and leg swelling. Negative for chest pain.   Gastrointestinal:  Negative for constipation and diarrhea.   Genitourinary:  Negative for genital sores.   Musculoskeletal:  Positive for arthralgias, joint swelling and myalgias.   Integumentary:  Positive for rash.   Neurological:  Positive for memory loss. Negative for headaches.   Hematological:  Bruises/bleeds easily.   Psychiatric/Behavioral:  Positive for sleep disturbance. The patient is nervous/anxious.     Past Medical History:   Diagnosis Date    Acid reflux     Arthritis     Carpal tunnel syndrome     Celiac disease     Dry mouth     Heart murmur     Hyperlipidemia     Hypertension      Past Surgical History:   Procedure Laterality Date    COLONOSCOPY      EGD, WITH BALLOON DILATION      MOUTH SURGERY      ROTATOR CUFF REPAIR      TRIGGER FINGER RELEASE        See the Assessment/Plan for further characterization of the HPI, ROS, Medical, Surgical, Family, and Social Histories including Tobacco use, Meds; all of which has been reviewed in Epic.    Medication List with Changes/Refills   Current Medications    AMLODIPINE (NORVASC) 10 MG TABLET        DICLOFENAC SODIUM  "(VOLTAREN) 1 % GEL        LISINOPRIL (PRINIVIL,ZESTRIL) 40 MG TABLET        MECLIZINE (ANTIVERT) 25 MG TABLET        METHOCARBAMOL (ROBAXIN) 500 MG TAB        OMEPRAZOLE (PRILOSEC) 40 MG CAPSULE    Take 40 mg by mouth once daily.    PRAVASTATIN (PRAVACHOL) 20 MG TABLET        ROPINIROLE (REQUIP) 0.5 MG TABLET    ropinirole 0.5 mg tablet   Take 1 tablet 3 times a day by oral route.   Changed and/or Refilled Medications    Modified Medication Previous Medication    NAPROXEN (NAPROSYN) 500 MG TABLET naproxen (NAPROSYN) 500 MG tablet       Take 1 tablet (500 mg total) by mouth 2 (two) times daily as needed (pain).    Take 1 tablet (500 mg total) by mouth 2 (two) times daily as needed (pain).    TRAMADOL (ULTRAM) 50 MG TABLET traMADoL (ULTRAM) 50 mg tablet       Take 2 tablets (100 mg total) by mouth 3 (three) times daily as needed for Pain (instead of oxycodone).    Take 2 tablets (100 mg total) by mouth 3 (three) times daily as needed for Pain (instead of oxycodone).         Objective:   /83   Pulse 72   Resp 16   Ht 5' 4" (1.626 m)   Wt 60.3 kg (133 lb)   SpO2 98%   BMI 22.83 kg/m²   Physical Exam  Non-toxic appearance. No distress.   Normocephalic and atraumatic.   External ears normal.    Conjunctivae and EOM are normal. No scleral icterus.   RRR, No friction rub; palpable distal pulses.    No tachypnea or signs of respiratory distress. CTAB.  Abdominal: No guarding or rebound tenderness.   Neurological: Oriented. Normal thought content. No cranial nerve deficit. Strength is grossly normal without focal deficit.  Skin is warm. No pallor.   Musculoskeletal: DJD. see below for further input. No overt synovitis.     No image results found.    No visits with results within 1 Year(s) from this visit.   Latest known visit with results is:   No results found for any previous visit.        All of the data above and below has been reviewed by myself and any further interpretations will be reflected in the " assessment and plan.   The data includes review of external notes, and independent interpretation of lab results, x-rays, and imaging reports.  Active inflammatory arthritis is an illness that poses a threat to bodily function and even a threat to life in some cases.  Drug therapy to treat inflammatory arthritis usually requires intensive monitoring for toxicity.    Review of patient's allergies indicates:  No Known Allergies  Assessment/Plan:       Prev noted/prior plan:   (No overt synovitis  DJD generalized is impressive on exam  Heart murmur     Verbal education including long discussion of what is causing his pain, what else we are looking into, and that there won't be a medicine that will mask the pain any more than the prior narcotics oxycodone 10mg tid he had been on     He should consider also working with Pain Management if needing pain management with high dose narcotics     He tells me he had to wait to long in waiting room to see Dr. Krueger is why he left     I can resume tramadol 100mg tid prn moderate pain for now     He should also resume  Naprosyn 500mg bid prn unless there a reason he cannot use?     Blood work to further evaluate     Revisit 4 weeks     Contact us prn)     Visit prior to Last visit:     Interim lab 1/13/22:          C3 97  C4 18     SPEP alpha 2 little low 0.52  Ig levels normal     WBC 4.8; Hgb 15.4; plt 324     Creat 0.74; alb 4.6     ESR 5 CRP <3     GEORGE neg RF neg CCP neg HLAB27 neg HepBsAgsAbnegcoreAb neg HCV neg uric acid 7     2022 TB negative        tramadol 100mg tid prn moderate pain for now     He should also resume  Naprosyn 500mg bid prn unless there a reason he cannot use? Prev noted     He has interim symptoms of inflammatory arthritis     I see that he also got some interim hydrocodone noted     He may be working with a different Orthopedist now;   He tells me he had declined further management including possible knee surgery with Dr. Diego, but      since  working with a new doctor     He had a left shoulder injection and may have a carpal tunnel injection in the future     No overt synovitis  DJD impressive including hands and knees   Rotator cuff     Went over lab including RF neg     I agree with most of Dr. Krueger's impression     I am not seeing active inflammatory arthritis or connective tissue disease on exam or lab noted     Verbal education     We talked and mostly listened for while today; I feel for him and I am impressed he has been able to wean the narcotics     He does have a lot of Osteoarthritis; I don't see active inflammatory arthritis     I am okay with giving him the tramadol with caveats     tramadol 100mg tid prn moderate pain for now     It sounds like he is also working with Orthopedics;   He is not sure but the doctor did give him a shoulder injection noted     xrays to further evaluate     Revisit 2 months with lab 2 weeks prior     Contact us prn     Last visit:     Interim lab 3/15/22:    Creat 0.64; alb 4.8     WBC 4.8; Hgb 15; plt 327     UA SG 1.015 rare cell trace ketones        ESR<1; CRP <3        Interim xray reports: he held off due to cost but should proceed in future when able     Naprosyn 500mg bid prn      tramadol 100mg tid prn moderate pain for now     He has some interim symptoms inflammatory arthritis also symptoms of carpal tunnel     He tells me he has also been working with Orthopedics and a hand specialist and may have left shoulder surgery in the future     He also had some xrays with them and he will plan to have them forward to us and we will seek if not sent next visit  He should also get the xrays at his convenience     He has made some dietary changes trying to be more gluten free which is great     He tells me he also went to ED interim for pulled muscle in stomach after lifting something heavy that is better noted     No overt synovitis  DJD is impressive in hands on exam  Rotator cuff     Went over  interim lab      I am not seeing active inflammatory arthritis or connective tissue disease on exam or lab noted.     We will observe otherwise and he will plan to call if joint swelling recurs;      Xray when able and send us other     Refilled naprosyn and tramadol     Revisit 6 months with lab 2 weeks prior        Contact us prn     This visit:    Interim lab 9/19/22:        Creat 0.71 alb 4.8    WBC 4.3; Hgb 16.7; plt 298    UA SG 1.01 rare cell    ESR<1 CRP <3      Interim Xray when able and     Interim prior xray reports:           Prior plan: (Interim xray reports: he held off due to cost but should proceed in future when able   Naprosyn 500mg bid prn    tramadol 100mg tid prn moderate pain for now   He has some interim symptoms inflammatory arthritis also symptoms of carpal tunnel   He tells me he has also been working with Orthopedics and a hand specialist and may have left shoulder surgery in the future   He also had some xrays with them and he will plan to have them forward to us and we will seek if not sent next visit  He should also get the xrays at his convenience  He has made some dietary changes trying to be more gluten free which is great  He tells me he also went to ED interim for pulled muscle in stomach after lifting something heavy that is better noted  No overt synovitis  DJD is impressive in hands on exam  Rotator cuff  I am not seeing active inflammatory arthritis or connective tissue disease on exam or lab noted.   We will observe otherwise and he will plan to call if joint swelling recurs;   Refilled naprosyn and tramadol)      He has some interim symptoms of inflammatory arthritis; other problems on ROS he will also plan to update other caregivers if not already    He held off getting the xrays again noted, but a bit back and forth he thinks he may have done them:   he tells me he rather had the xrays with Dr. Minor and we will seek those results      I see he is also getting some  hydrocodone from [facility] in ? Yes he tells me he had left shoulder surgery with Dr. Minor a few months ago that went fairly well    He continues to have intermittent symptoms of inflammatory arthritis ongoing    He may have thumb surgery with Dr. Fung in the future noted      No overt synovitis  DJD is impressive in hands on exam  Rotator cuff    Verbal education    Went over lab     I am not seeing active inflammatory arthritis or connective tissue disease on exam or lab noted. He will plan to call if recurs    Cont current  See prior plan    We will seek his xray reports from Dr. Minor and if he did not have ours; he can proceed with getting at his convenience      Revisit lab 2 weeks prior    Contact us prn       GEORGE neg CCP neg  GEORGE neg RF neg CCP neg HLAB27 neg HepBsAgsAbnegcoreAb neg HCV neg uric acid 7 2022 TB negative     There is some history to suggest inflammatory arthritis.     Celiac disease per records from PCP     There is also some mechanical and neurogenic pain.     Recently found to have left Carpal tunnel on EMG/NCS 12/13/21 he brought; he can update Orthopedist if not already     Right ring finger surgery  Right shoulder surgery Dr. Diego     Severe DJD in knees left worse than right     Has been managing with:     Naprosyn 500mg bid he tells me he rather does not use     Robaxin he tells me he rather does not use and plan to clarify his med list with Tori Hamlin gel     Prior oxycodone 10mg tid that he was still getting up until a couple of months ago     Prev noted/prior plan:   (No overt synovitis  DJD generalized is impressive on exam  Heart murmur     Verbal education including long discussion of what is causing his pain, what else we are looking into, and that there won't be a medicine that will mask the pain any more than the prior narcotics oxycodone 10mg tid he had been on     He should consider also working with Pain Management if needing pain management  with high dose narcotics     He tells me he had to wait to long in waiting room to see Dr. Krueger is why he left     I can resume tramadol 100mg tid prn moderate pain for now     He should also resume  Naprosyn 500mg bid prn unless there a reason he cannot use?     Blood work to further evaluate     Revisit 4 weeks     Contact us prn)     Has been working with:     Celiac disease followed by GI     Aortic stenosis Dr. [facility]      Records Dr. Krueger some of note 5/26/21: OA shoulder; lumbar spine pain; failed NSAIDs; Opiates help ADL; improvement with steroid injection; A/P: RF+ mild; does not suggest active rheumatoid arthritis; OA; DJD hips, hands, spine; Oxycodone 10mg tid; Lab; revisit 1 month     8/6/21 xray reports: right shoulder severe DJD post op changes; severe DJD left knee worse than right 3 compartment       7/31/20 CT abd/pelvis: mild fatty infiltration liver; cysts versus hemangiomas 1.1 cm left hepatic lobe; thick sigmoid colon may be secondary to prior colitis; recommend MRI multiphase to further evaluate     Review of medical records as stated above.  Lab +/- imaging and other ordered diagnostic studies to further evaluate.  See the orders associated with this note visit.  Medications as prescribed as tolerated.    Education including verbal and those noted in the patient instructions.  Revisit as scheduled and call prn.    The following diagnoses influence medical decision making and/or need further workup including the orders listed below:    Pain in other joint  -     CBC Auto Differential; Future; Expected date: 01/16/2023  -     CK; Future; Expected date: 01/16/2023  -     Comprehensive Metabolic Panel; Future; Expected date: 01/16/2023  -     C-Reactive Protein; Future; Expected date: 01/16/2023  -     Sedimentation rate; Future; Expected date: 01/16/2023  -     Urinalysis; Future; Expected date: 01/16/2023  -     Uric Acid; Future; Expected date: 01/16/2023  -     traMADoL (ULTRAM)  50 mg tablet; Take 2 tablets (100 mg total) by mouth 3 (three) times daily as needed for Pain (instead of oxycodone).  Dispense: 180 tablet; Refill: 1  -     naproxen (NAPROSYN) 500 MG tablet; Take 1 tablet (500 mg total) by mouth 2 (two) times daily as needed (pain).  Dispense: 180 tablet; Refill: 0    Polyarthralgia  -     CBC Auto Differential; Future; Expected date: 01/16/2023  -     CK; Future; Expected date: 01/16/2023  -     Comprehensive Metabolic Panel; Future; Expected date: 01/16/2023  -     C-Reactive Protein; Future; Expected date: 01/16/2023  -     Sedimentation rate; Future; Expected date: 01/16/2023  -     Urinalysis; Future; Expected date: 01/16/2023  -     Uric Acid; Future; Expected date: 01/16/2023  -     traMADoL (ULTRAM) 50 mg tablet; Take 2 tablets (100 mg total) by mouth 3 (three) times daily as needed for Pain (instead of oxycodone).  Dispense: 180 tablet; Refill: 1  -     naproxen (NAPROSYN) 500 MG tablet; Take 1 tablet (500 mg total) by mouth 2 (two) times daily as needed (pain).  Dispense: 180 tablet; Refill: 0    Other osteoarthritis involving multiple joints  -     CBC Auto Differential; Future; Expected date: 01/16/2023  -     CK; Future; Expected date: 01/16/2023  -     Comprehensive Metabolic Panel; Future; Expected date: 01/16/2023  -     C-Reactive Protein; Future; Expected date: 01/16/2023  -     Sedimentation rate; Future; Expected date: 01/16/2023  -     Urinalysis; Future; Expected date: 01/16/2023  -     Uric Acid; Future; Expected date: 01/16/2023  -     traMADoL (ULTRAM) 50 mg tablet; Take 2 tablets (100 mg total) by mouth 3 (three) times daily as needed for Pain (instead of oxycodone).  Dispense: 180 tablet; Refill: 1  -     naproxen (NAPROSYN) 500 MG tablet; Take 1 tablet (500 mg total) by mouth 2 (two) times daily as needed (pain).  Dispense: 180 tablet; Refill: 0    Lumbar degenerative disc disease  -     CBC Auto Differential; Future; Expected date: 01/16/2023  -      CK; Future; Expected date: 01/16/2023  -     Comprehensive Metabolic Panel; Future; Expected date: 01/16/2023  -     C-Reactive Protein; Future; Expected date: 01/16/2023  -     Sedimentation rate; Future; Expected date: 01/16/2023  -     Urinalysis; Future; Expected date: 01/16/2023  -     Uric Acid; Future; Expected date: 01/16/2023  -     traMADoL (ULTRAM) 50 mg tablet; Take 2 tablets (100 mg total) by mouth 3 (three) times daily as needed for Pain (instead of oxycodone).  Dispense: 180 tablet; Refill: 1  -     naproxen (NAPROSYN) 500 MG tablet; Take 1 tablet (500 mg total) by mouth 2 (two) times daily as needed (pain).  Dispense: 180 tablet; Refill: 0    Celiac disease  -     CBC Auto Differential; Future; Expected date: 01/16/2023  -     CK; Future; Expected date: 01/16/2023  -     Comprehensive Metabolic Panel; Future; Expected date: 01/16/2023  -     C-Reactive Protein; Future; Expected date: 01/16/2023  -     Sedimentation rate; Future; Expected date: 01/16/2023  -     Urinalysis; Future; Expected date: 01/16/2023  -     Uric Acid; Future; Expected date: 01/16/2023    Myalgia  -     CBC Auto Differential; Future; Expected date: 01/16/2023  -     CK; Future; Expected date: 01/16/2023  -     Comprehensive Metabolic Panel; Future; Expected date: 01/16/2023  -     C-Reactive Protein; Future; Expected date: 01/16/2023  -     Sedimentation rate; Future; Expected date: 01/16/2023  -     Urinalysis; Future; Expected date: 01/16/2023  -     Uric Acid; Future; Expected date: 01/16/2023    Screening for rheumatic disorder  -     CBC Auto Differential; Future; Expected date: 01/16/2023  -     CK; Future; Expected date: 01/16/2023  -     Comprehensive Metabolic Panel; Future; Expected date: 01/16/2023  -     C-Reactive Protein; Future; Expected date: 01/16/2023  -     Sedimentation rate; Future; Expected date: 01/16/2023  -     Urinalysis; Future; Expected date: 01/16/2023  -     Uric Acid; Future; Expected date:  01/16/2023    Carpal tunnel syndrome of left wrist  -     CBC Auto Differential; Future; Expected date: 01/16/2023  -     CK; Future; Expected date: 01/16/2023  -     Comprehensive Metabolic Panel; Future; Expected date: 01/16/2023  -     C-Reactive Protein; Future; Expected date: 01/16/2023  -     Sedimentation rate; Future; Expected date: 01/16/2023  -     Urinalysis; Future; Expected date: 01/16/2023  -     Uric Acid; Future; Expected date: 01/16/2023    Rotator cuff disorder, left  -     CBC Auto Differential; Future; Expected date: 01/16/2023  -     CK; Future; Expected date: 01/16/2023  -     Comprehensive Metabolic Panel; Future; Expected date: 01/16/2023  -     C-Reactive Protein; Future; Expected date: 01/16/2023  -     Sedimentation rate; Future; Expected date: 01/16/2023  -     Urinalysis; Future; Expected date: 01/16/2023  -     Uric Acid; Future; Expected date: 01/16/2023    Follow up in about 4 months (around 1/30/2023).     Graham Zheng MD

## 2022-09-19 DIAGNOSIS — M51.36 LUMBAR DEGENERATIVE DISC DISEASE: ICD-10-CM

## 2022-09-19 DIAGNOSIS — M25.59 PAIN IN OTHER JOINT: ICD-10-CM

## 2022-09-19 DIAGNOSIS — M15.8 OTHER OSTEOARTHRITIS INVOLVING MULTIPLE JOINTS: ICD-10-CM

## 2022-09-19 DIAGNOSIS — M25.50 POLYARTHRALGIA: ICD-10-CM

## 2022-09-19 RX ORDER — NAPROXEN 500 MG/1
500 TABLET ORAL 2 TIMES DAILY PRN
Qty: 180 TABLET | Refills: 0 | Status: SHIPPED | OUTPATIENT
Start: 2022-09-19 | End: 2022-09-30 | Stop reason: SDUPTHER

## 2022-09-21 DIAGNOSIS — M25.59 PAIN IN OTHER JOINT: ICD-10-CM

## 2022-09-21 DIAGNOSIS — M25.50 POLYARTHRALGIA: ICD-10-CM

## 2022-09-21 DIAGNOSIS — M15.8 OTHER OSTEOARTHRITIS INVOLVING MULTIPLE JOINTS: ICD-10-CM

## 2022-09-21 DIAGNOSIS — M51.36 LUMBAR DEGENERATIVE DISC DISEASE: ICD-10-CM

## 2022-09-21 RX ORDER — TRAMADOL HYDROCHLORIDE 50 MG/1
100 TABLET ORAL 3 TIMES DAILY PRN
Qty: 180 TABLET | Refills: 1 | Status: SHIPPED | OUTPATIENT
Start: 2022-09-21 | End: 2022-09-30 | Stop reason: SDUPTHER

## 2022-09-30 ENCOUNTER — OFFICE VISIT (OUTPATIENT)
Dept: RHEUMATOLOGY | Facility: CLINIC | Age: 62
End: 2022-09-30
Payer: COMMERCIAL

## 2022-09-30 VITALS
DIASTOLIC BLOOD PRESSURE: 83 MMHG | BODY MASS INDEX: 22.71 KG/M2 | WEIGHT: 133 LBS | RESPIRATION RATE: 16 BRPM | OXYGEN SATURATION: 98 % | SYSTOLIC BLOOD PRESSURE: 129 MMHG | HEART RATE: 72 BPM | HEIGHT: 64 IN

## 2022-09-30 DIAGNOSIS — G56.02 CARPAL TUNNEL SYNDROME OF LEFT WRIST: ICD-10-CM

## 2022-09-30 DIAGNOSIS — M79.10 MYALGIA: ICD-10-CM

## 2022-09-30 DIAGNOSIS — M15.8 OTHER OSTEOARTHRITIS INVOLVING MULTIPLE JOINTS: ICD-10-CM

## 2022-09-30 DIAGNOSIS — M25.59 PAIN IN OTHER JOINT: Primary | ICD-10-CM

## 2022-09-30 DIAGNOSIS — M51.36 LUMBAR DEGENERATIVE DISC DISEASE: ICD-10-CM

## 2022-09-30 DIAGNOSIS — M25.50 POLYARTHRALGIA: ICD-10-CM

## 2022-09-30 DIAGNOSIS — M67.912 ROTATOR CUFF DISORDER, LEFT: ICD-10-CM

## 2022-09-30 DIAGNOSIS — K90.0 CELIAC DISEASE: ICD-10-CM

## 2022-09-30 DIAGNOSIS — Z13.89 SCREENING FOR RHEUMATIC DISORDER: ICD-10-CM

## 2022-09-30 PROCEDURE — 3008F PR BODY MASS INDEX (BMI) DOCUMENTED: ICD-10-PCS | Mod: CPTII,S$GLB,, | Performed by: INTERNAL MEDICINE

## 2022-09-30 PROCEDURE — 1159F MED LIST DOCD IN RCRD: CPT | Mod: CPTII,S$GLB,, | Performed by: INTERNAL MEDICINE

## 2022-09-30 PROCEDURE — 4010F PR ACE/ARB THEARPY RXD/TAKEN: ICD-10-PCS | Mod: CPTII,S$GLB,, | Performed by: INTERNAL MEDICINE

## 2022-09-30 PROCEDURE — 1160F PR REVIEW ALL MEDS BY PRESCRIBER/CLIN PHARMACIST DOCUMENTED: ICD-10-PCS | Mod: CPTII,S$GLB,, | Performed by: INTERNAL MEDICINE

## 2022-09-30 PROCEDURE — 1159F PR MEDICATION LIST DOCUMENTED IN MEDICAL RECORD: ICD-10-PCS | Mod: CPTII,S$GLB,, | Performed by: INTERNAL MEDICINE

## 2022-09-30 PROCEDURE — 99214 OFFICE O/P EST MOD 30 MIN: CPT | Mod: S$GLB,,, | Performed by: INTERNAL MEDICINE

## 2022-09-30 PROCEDURE — 3074F PR MOST RECENT SYSTOLIC BLOOD PRESSURE < 130 MM HG: ICD-10-PCS | Mod: CPTII,S$GLB,, | Performed by: INTERNAL MEDICINE

## 2022-09-30 PROCEDURE — 3074F SYST BP LT 130 MM HG: CPT | Mod: CPTII,S$GLB,, | Performed by: INTERNAL MEDICINE

## 2022-09-30 PROCEDURE — 1160F RVW MEDS BY RX/DR IN RCRD: CPT | Mod: CPTII,S$GLB,, | Performed by: INTERNAL MEDICINE

## 2022-09-30 PROCEDURE — 3008F BODY MASS INDEX DOCD: CPT | Mod: CPTII,S$GLB,, | Performed by: INTERNAL MEDICINE

## 2022-09-30 PROCEDURE — 3079F PR MOST RECENT DIASTOLIC BLOOD PRESSURE 80-89 MM HG: ICD-10-PCS | Mod: CPTII,S$GLB,, | Performed by: INTERNAL MEDICINE

## 2022-09-30 PROCEDURE — 99214 PR OFFICE/OUTPT VISIT, EST, LEVL IV, 30-39 MIN: ICD-10-PCS | Mod: S$GLB,,, | Performed by: INTERNAL MEDICINE

## 2022-09-30 PROCEDURE — 3079F DIAST BP 80-89 MM HG: CPT | Mod: CPTII,S$GLB,, | Performed by: INTERNAL MEDICINE

## 2022-09-30 PROCEDURE — 4010F ACE/ARB THERAPY RXD/TAKEN: CPT | Mod: CPTII,S$GLB,, | Performed by: INTERNAL MEDICINE

## 2022-09-30 RX ORDER — NAPROXEN 500 MG/1
500 TABLET ORAL 2 TIMES DAILY PRN
Qty: 180 TABLET | Refills: 0 | Status: SHIPPED | OUTPATIENT
Start: 2022-09-30 | End: 2023-01-30 | Stop reason: SDUPTHER

## 2022-09-30 RX ORDER — TRAMADOL HYDROCHLORIDE 50 MG/1
100 TABLET ORAL 3 TIMES DAILY PRN
Qty: 180 TABLET | Refills: 1 | Status: SHIPPED | OUTPATIENT
Start: 2022-09-30 | End: 2023-01-30 | Stop reason: SDUPTHER

## 2023-01-17 NOTE — PROGRESS NOTES
Subjective:      Patient ID: Kayode Merrill is a 63 y.o. male.    Chief Complaint: Disease Management    HPI:   Includes joint pain with subjective swelling.   Antecedent event includes: None;  Pain location includes: hands, knees, and right foot;  Gradual onset; beginning >years ago;  Constant ache, Moderate in severity,   Lasting >minutes, Worse at all times;   Improved with rest, medication, change in position, and none;   Worsened with activity, overuse, stress, and rest;         Review of Systems   Constitutional:  Negative for chills, fatigue and fever.   HENT:  Positive for tinnitus. Negative for nasal congestion, ear pain, hearing loss, mouth dryness, mouth sores, nosebleeds and trouble swallowing.    Eyes:  Positive for visual disturbance. Negative for photophobia, pain, redness and eye dryness.   Respiratory:  Positive for shortness of breath. Negative for cough.    Cardiovascular:  Positive for palpitations and leg swelling. Negative for chest pain.   Gastrointestinal:  Negative for abdominal distention, abdominal pain, blood in stool, constipation, diarrhea, rectal pain, vomiting and fecal incontinence.   Endocrine: Negative for polydipsia and polyuria.   Genitourinary:  Negative for bladder incontinence, dysuria, enuresis, genital sores and hematuria.   Musculoskeletal:  Positive for arthralgias, joint swelling and myalgias.   Integumentary:  Negative for rash, wound and mole/lesion.   Neurological:  Positive for weakness and memory loss. Negative for headaches.   Hematological:  Negative for adenopathy. Bruises/bleeds easily.   Psychiatric/Behavioral:  Positive for dysphoric mood and sleep disturbance. The patient is nervous/anxious.     Past Medical History:   Diagnosis Date    Acid reflux     Arthritis     Carpal tunnel syndrome     Celiac disease     Dry mouth     Heart murmur     Hyperlipidemia     Hypertension      Past Surgical History:   Procedure Laterality Date    COLONOSCOPY      EGD, WITH  "BALLOON DILATION      MOUTH SURGERY      ROTATOR CUFF REPAIR      TRIGGER FINGER RELEASE        See the Assessment/Plan for further characterization of the HPI, ROS, Medical, Surgical, Family, and Social Histories including Tobacco use, Meds; all of which has been reviewed in Epic.    Medication List with Changes/Refills   Current Medications    AMLODIPINE (NORVASC) 10 MG TABLET        DICLOFENAC SODIUM (VOLTAREN) 1 % GEL        LISINOPRIL (PRINIVIL,ZESTRIL) 40 MG TABLET        MECLIZINE (ANTIVERT) 25 MG TABLET        METHOCARBAMOL (ROBAXIN) 500 MG TAB        OMEPRAZOLE (PRILOSEC) 40 MG CAPSULE    Take 40 mg by mouth once daily.    PRAVASTATIN (PRAVACHOL) 20 MG TABLET        ROPINIROLE (REQUIP) 0.5 MG TABLET    ropinirole 0.5 mg tablet   Take 1 tablet 3 times a day by oral route.   Changed and/or Refilled Medications    Modified Medication Previous Medication    NAPROXEN (NAPROSYN) 500 MG TABLET naproxen (NAPROSYN) 500 MG tablet       Take 1 tablet (500 mg total) by mouth 2 (two) times daily as needed (pain).    Take 1 tablet (500 mg total) by mouth 2 (two) times daily as needed (pain).    TRAMADOL (ULTRAM) 50 MG TABLET traMADoL (ULTRAM) 50 mg tablet       Take 2 tablets (100 mg total) by mouth 3 (three) times daily as needed for Pain (instead of oxycodone).    Take 2 tablets (100 mg total) by mouth 3 (three) times daily as needed for Pain (instead of oxycodone).         Objective:   BP (!) 135/92   Pulse 68   Resp 18   Ht 5' 4" (1.626 m)   Wt 60.5 kg (133 lb 6.4 oz)   SpO2 99%   BMI 22.90 kg/m²   Physical Exam  Non-toxic appearance. No distress.   Normocephalic and atraumatic.   External ears normal.    Conjunctivae and EOM are normal. No scleral icterus.   RRR, No friction rub; palpable distal pulses.    No tachypnea or signs of respiratory distress.   Abdominal: No guarding or rebound tenderness.   Neurological: Oriented. Normal thought content.   Skin is warm. No pallor.   Musculoskeletal: see below for " further input.     No image results found.    No visits with results within 1 Year(s) from this visit.   Latest known visit with results is:   No results found for any previous visit.        All of the data above and below has been reviewed by myself and any further interpretations will be reflected in the assessment and plan.   The data includes review of external notes, and independent interpretation of lab results, x-rays, and imaging reports.  Active inflammatory arthritis is an illness that poses a threat to bodily function and even a threat to life in some cases.  Drug therapy to treat inflammatory arthritis usually requires intensive monitoring for toxicity.    Review of patient's allergies indicates:  No Known Allergies  Assessment/Plan:          Prev noted/prior plan:   (No overt synovitis  DJD generalized is impressive on exam  Heart murmur     Verbal education including long discussion of what is causing his pain, what else we are looking into, and that there won't be a medicine that will mask the pain any more than the prior narcotics oxycodone 10mg tid he had been on     He should consider also working with Pain Management if needing pain management with high dose narcotics     He tells me he had to wait to long in waiting room to see Dr. Krueger is why he left     I can resume tramadol 100mg tid prn moderate pain for now     He should also resume  Naprosyn 500mg bid prn unless there a reason he cannot use?     Blood work to further evaluate     Revisit 4 weeks     Contact us prn)     Visit prior to Visit prior to Last visit:     Interim lab 1/13/22:          C3 97  C4 18     SPEP alpha 2 little low 0.52  Ig levels normal     WBC 4.8; Hgb 15.4; plt 324     Creat 0.74; alb 4.6     ESR 5 CRP <3     GEORGE neg RF neg CCP neg HLAB27 neg HepBsAgsAbnegcoreAb neg HCV neg uric acid 7     2022 TB negative        tramadol 100mg tid prn moderate pain for now     He should also resume  Naprosyn 500mg bid prn  unless there a reason he cannot use? Prev noted     He has interim symptoms of inflammatory arthritis     I see that he also got some interim hydrocodone noted     He may be working with a different Orthopedist now;   He tells me he had declined further management including possible knee surgery with Dr. Diego, but      since working with a new doctor     He had a left shoulder injection and may have a carpal tunnel injection in the future     No overt synovitis  DJD impressive including hands and knees   Rotator cuff     Went over lab including RF neg     I agree with most of Dr. Krueger's impression     I am not seeing active inflammatory arthritis or connective tissue disease on exam or lab noted     Verbal education     We talked and mostly listened for while today; I feel for him and I am impressed he has been able to wean the narcotics     He does have a lot of Osteoarthritis; I don't see active inflammatory arthritis     I am okay with giving him the tramadol with caveats     tramadol 100mg tid prn moderate pain for now     It sounds like he is also working with Orthopedics;   He is not sure but the doctor did give him a shoulder injection noted     xrays to further evaluate     Revisit 2 months with lab 2 weeks prior     Contact us prn     Visit prior to Last visit:     Interim lab 3/15/22:    Creat 0.64; alb 4.8     WBC 4.8; Hgb 15; plt 327     UA SG 1.015 rare cell trace ketones        ESR<1; CRP <3        Interim xray reports: he held off due to cost but should proceed in future when able     Naprosyn 500mg bid prn      tramadol 100mg tid prn moderate pain for now     He has some interim symptoms inflammatory arthritis also symptoms of carpal tunnel     He tells me he has also been working with Orthopedics and a hand specialist and may have left shoulder surgery in the future     He also had some xrays with them and he will plan to have them forward to us and we will seek if not sent next  visit  He should also get the xrays at his convenience     He has made some dietary changes trying to be more gluten free which is great     He tells me he also went to ED interim for pulled muscle in stomach after lifting something heavy that is better noted     No overt synovitis  DJD is impressive in hands on exam  Rotator cuff     Went over interim lab      I am not seeing active inflammatory arthritis or connective tissue disease on exam or lab noted.     We will observe otherwise and he will plan to call if joint swelling recurs;      Xray when able and send us other     Refilled naprosyn and tramadol     Revisit 6 months with lab 2 weeks prior        Contact us prn     Last visit:     Interim lab 9/19/22:          Creat 0.71 alb 4.8     WBC 4.3; Hgb 16.7; plt 298     UA SG 1.01 rare cell     ESR<1 CRP <3        Interim Xray when able and      Interim prior xray reports:               Prior plan: (Interim xray reports: he held off due to cost but should proceed in future when able   Naprosyn 500mg bid prn    tramadol 100mg tid prn moderate pain for now   He has some interim symptoms inflammatory arthritis also symptoms of carpal tunnel   He tells me he has also been working with Orthopedics and a hand specialist and may have left shoulder surgery in the future   He also had some xrays with them and he will plan to have them forward to us and we will seek if not sent next visit  He should also get the xrays at his convenience  He has made some dietary changes trying to be more gluten free which is great  He tells me he also went to ED interim for pulled muscle in stomach after lifting something heavy that is better noted  No overt synovitis  DJD is impressive in hands on exam  Rotator cuff  I am not seeing active inflammatory arthritis or connective tissue disease on exam or lab noted.   We will observe otherwise and he will plan to call if joint swelling recurs;   Refilled naprosyn and tramadol)         He has some interim symptoms of inflammatory arthritis; other problems on ROS he will also plan to update other caregivers if not already     He held off getting the xrays again noted, but a bit back and forth he thinks he may have done them:   he tells me he rather had the xrays with Dr. Minor and we will seek those results        I see he is also getting some hydrocodone from [facility] in ? Yes he tells me he had left shoulder surgery with Dr. Minor a few months ago that went fairly well     He continues to have intermittent symptoms of inflammatory arthritis ongoing     He may have thumb surgery with Dr. Fung in the future noted        No overt synovitis  DJD is impressive in hands on exam  Rotator cuff     Verbal education     Went over lab      I am not seeing active inflammatory arthritis or connective tissue disease on exam or lab noted. He will plan to call if recurs     Cont current  See prior plan     We will seek his xray reports from Dr. Minor and if he did not have ours; he can proceed with getting at his convenience        Revisit lab 2 weeks prior     Contact us prn     This visit:    Interim lab 1/18/23:    WBC 6.2; Hgb 14.7; plt 308    Creat 0.69; alb 4.1        UA rare cell SG 1.02      Uric acid 5.7      ESR 3 CRP<2.9    Interim xray reports:      seek his xray reports from Dr. 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         Prior plan: (Interim xray reports: he held off due to cost but should proceed in future when able   Naprosyn 500mg bid prn    tramadol 100mg tid prn moderate pain for now   He has some interim symptoms inflammatory arthritis also symptoms  of carpal tunnel   He tells me he has also been working with Orthopedics and a hand specialist and may have left shoulder surgery in the future   He also had some xrays with them and he will plan to have them forward to us and we will seek if not sent next visit  He should also get the xrays at his convenience  He has made some dietary changes trying to be more gluten free which is great  He tells me he also went to ED interim for pulled muscle in stomach after lifting something heavy that is better noted  No overt synovitis  DJD is impressive in hands on exam  Rotator cuff  I am not seeing active inflammatory arthritis or connective tissue disease on exam or lab noted.   We will observe otherwise and he will plan to call if joint swelling recurs;   Refilled naprosyn and tramadol)  Prev noted/prior plan:    (He has some interim symptoms of inflammatory arthritis; other problems on ROS he will also plan to update other caregivers if not already     He held off getting the xrays again noted, but a bit back and forth he thinks he may have done them:   he tells me he rather had the xrays with Dr. Minor and we will seek those results     I see he is also getting some hydrocodone from [facility] in ? Yes he tells me he had left shoulder surgery with Dr. Minor a few months ago that went fairly well     He continues to have intermittent symptoms of inflammatory arthritis ongoing     He may have thumb surgery with Dr. Fung in the future noted     No overt synovitis  DJD is impressive in hands on exam  Left knee deformity  Rotator cuff     Verbal education     Went over lab    I am not seeing active inflammatory arthritis or connective tissue disease on exam or lab noted. He will plan to call if recurs   Cont current  See prior plan   We will seek his xray reports from Dr. Minor and if he did not have ours; he can proceed with getting at his convenience)       He has some interim symptoms of inflammatory  arthritis    He also has left knee deformity he is to get MRI left knee working with Orthopedics    He rather clarifies he did have his xrays (that I ordered) at [facility] and we will seek those results      No overt synovitis  DJD is impressive in hands on exam  Rotator cuff  Left knee deformity    Verbal education    Went over interim lab and prior imaging reports: We will seek his xray reports (that I had ordered)      See prior        Revisit lab 2 weeks prior    Contact us prn      His prescriptions are failing to send to his pharmacy and I am printing them so Marlin can fax them    Addendum 2/27/23: He brought [facility] report left knee 2/16/23 and looks like second page is missing but: Impression: tear medial meniscus, tear anterior horn lateral meniscus; paralabral cyst or synovial cyst mild joint effusion with small baker's cyst; tricompartmental full thickness cartilage defects most pronounced      GEORGE neg CCP neg  GEORGE neg RF neg CCP neg HLAB27 neg HepBsAgsAbnegcoreAb neg HCV neg uric acid 7 repeat 5.7     2022 TB negative     There is some history to suggest inflammatory arthritis.     Celiac disease per records from PCP     There is also some mechanical and neurogenic pain.     Recently found to have left Carpal tunnel on EMG/NCS 12/13/21 he brought; he can update Orthopedist if not already     Right ring finger surgery  Right shoulder surgery Dr. Diego     Severe DJD in knees left worse than right     Has been managing with:     Naprosyn 500mg bid he tells me he rather does not use     Robaxin he tells me he rather does not use and plan to clarify his med list with Tori     Voltaren gel     Prior oxycodone 10mg tid that he was still getting up until a couple of months ago     Prev noted/prior plan:   (No overt synovitis  DJD generalized is impressive on exam  Heart murmur     Verbal education including long discussion of what is causing his pain, what else we are looking into, and that there  won't be a medicine that will mask the pain any more than the prior narcotics oxycodone 10mg tid he had been on     He should consider also working with Pain Management if needing pain management with high dose narcotics   He tells me he had to wait to long in waiting room to see Dr. Krueger is why he left   I can resume tramadol 100mg tid prn moderate pain for now   He should also resume  Naprosyn 500mg bid prn unless there a reason he cannot use?   Blood work to further evaluate   Revisit 4 weeks   Contact us prn)     Has been working with:     Celiac disease followed by GI     Aortic stenosis Dr. [facility]      Records Dr. Krueger some of note 5/26/21: OA shoulder; lumbar spine pain; failed NSAIDs; Opiates help ADL; improvement with steroid injection; A/P: RF+ mild; does not suggest active rheumatoid arthritis; OA; DJD hips, hands, spine; Oxycodone 10mg tid; Lab; revisit 1 month     8/6/21 xray reports: right shoulder severe DJD post op changes; severe DJD left knee worse than right 3 compartment       7/31/20 CT abd/pelvis: mild fatty infiltration liver; cysts versus hemangiomas 1.1 cm left hepatic lobe; thick sigmoid colon may be secondary to prior colitis; recommend MRI multiphase to further evaluate     Review of medical records as stated above.  Lab +/- imaging and other ordered diagnostic studies to further evaluate.  See the orders associated with this note visit.  Medications as prescribed as tolerated.    Education including verbal and those noted in the patient instructions.  Revisit as scheduled and call prn.    The following diagnoses influence medical decision making and/or need further workup including the orders listed below:    Pain in other joint  -     GEORGE by IFA, w/Rflx; Future; Expected date: 04/17/2023  -     Comprehensive Metabolic Panel; Future; Expected date: 04/17/2023  -     C-Reactive Protein; Future; Expected date: 04/17/2023  -     C3 Complement; Future; Expected date:  04/17/2023  -     C4 Complement; Future; Expected date: 04/17/2023  -     CBC Auto Differential; Future; Expected date: 04/17/2023  -     Urinalysis Microscopic; Future; Expected date: 04/17/2023  -     Sedimentation rate; Future; Expected date: 04/17/2023  -     CK; Future; Expected date: 04/17/2023  -     Rheumatoid Factor; Future; Expected date: 04/17/2023  -     Discontinue: naproxen (NAPROSYN) 500 MG tablet; Take 1 tablet (500 mg total) by mouth 2 (two) times daily as needed (pain).  Dispense: 180 tablet; Refill: 0  -     Discontinue: traMADoL (ULTRAM) 50 mg tablet; Take 2 tablets (100 mg total) by mouth 3 (three) times daily as needed for Pain (instead of oxycodone).  Dispense: 180 tablet; Refill: 1  -     Discontinue: naproxen (NAPROSYN) 500 MG tablet; Take 1 tablet (500 mg total) by mouth 2 (two) times daily as needed (pain).  Dispense: 180 tablet; Refill: 0  -     Discontinue: traMADoL (ULTRAM) 50 mg tablet; Take 2 tablets (100 mg total) by mouth 3 (three) times daily as needed for Pain (instead of oxycodone).  Dispense: 180 tablet; Refill: 0  -     naproxen (NAPROSYN) 500 MG tablet; Take 1 tablet (500 mg total) by mouth 2 (two) times daily as needed (pain).  Dispense: 180 tablet; Refill: 0  -     traMADoL (ULTRAM) 50 mg tablet; Take 2 tablets (100 mg total) by mouth 3 (three) times daily as needed for Pain (instead of oxycodone).  Dispense: 180 tablet; Refill: 0    Polyarthralgia  -     GEORGE by IFA, w/Rflx; Future; Expected date: 04/17/2023  -     Comprehensive Metabolic Panel; Future; Expected date: 04/17/2023  -     C-Reactive Protein; Future; Expected date: 04/17/2023  -     C3 Complement; Future; Expected date: 04/17/2023  -     C4 Complement; Future; Expected date: 04/17/2023  -     CBC Auto Differential; Future; Expected date: 04/17/2023  -     Urinalysis Microscopic; Future; Expected date: 04/17/2023  -     Sedimentation rate; Future; Expected date: 04/17/2023  -     CK; Future; Expected date:  04/17/2023  -     Rheumatoid Factor; Future; Expected date: 04/17/2023  -     Discontinue: naproxen (NAPROSYN) 500 MG tablet; Take 1 tablet (500 mg total) by mouth 2 (two) times daily as needed (pain).  Dispense: 180 tablet; Refill: 0  -     Discontinue: traMADoL (ULTRAM) 50 mg tablet; Take 2 tablets (100 mg total) by mouth 3 (three) times daily as needed for Pain (instead of oxycodone).  Dispense: 180 tablet; Refill: 1  -     Discontinue: naproxen (NAPROSYN) 500 MG tablet; Take 1 tablet (500 mg total) by mouth 2 (two) times daily as needed (pain).  Dispense: 180 tablet; Refill: 0  -     Discontinue: traMADoL (ULTRAM) 50 mg tablet; Take 2 tablets (100 mg total) by mouth 3 (three) times daily as needed for Pain (instead of oxycodone).  Dispense: 180 tablet; Refill: 0  -     naproxen (NAPROSYN) 500 MG tablet; Take 1 tablet (500 mg total) by mouth 2 (two) times daily as needed (pain).  Dispense: 180 tablet; Refill: 0  -     traMADoL (ULTRAM) 50 mg tablet; Take 2 tablets (100 mg total) by mouth 3 (three) times daily as needed for Pain (instead of oxycodone).  Dispense: 180 tablet; Refill: 0    Other osteoarthritis involving multiple joints  -     GEORGE by IFA, w/Rflx; Future; Expected date: 04/17/2023  -     Comprehensive Metabolic Panel; Future; Expected date: 04/17/2023  -     C-Reactive Protein; Future; Expected date: 04/17/2023  -     C3 Complement; Future; Expected date: 04/17/2023  -     C4 Complement; Future; Expected date: 04/17/2023  -     CBC Auto Differential; Future; Expected date: 04/17/2023  -     Urinalysis Microscopic; Future; Expected date: 04/17/2023  -     Sedimentation rate; Future; Expected date: 04/17/2023  -     CK; Future; Expected date: 04/17/2023  -     Rheumatoid Factor; Future; Expected date: 04/17/2023  -     Discontinue: naproxen (NAPROSYN) 500 MG tablet; Take 1 tablet (500 mg total) by mouth 2 (two) times daily as needed (pain).  Dispense: 180 tablet; Refill: 0  -     Discontinue: traMADoL  (ULTRAM) 50 mg tablet; Take 2 tablets (100 mg total) by mouth 3 (three) times daily as needed for Pain (instead of oxycodone).  Dispense: 180 tablet; Refill: 1  -     Discontinue: naproxen (NAPROSYN) 500 MG tablet; Take 1 tablet (500 mg total) by mouth 2 (two) times daily as needed (pain).  Dispense: 180 tablet; Refill: 0  -     Discontinue: traMADoL (ULTRAM) 50 mg tablet; Take 2 tablets (100 mg total) by mouth 3 (three) times daily as needed for Pain (instead of oxycodone).  Dispense: 180 tablet; Refill: 0  -     naproxen (NAPROSYN) 500 MG tablet; Take 1 tablet (500 mg total) by mouth 2 (two) times daily as needed (pain).  Dispense: 180 tablet; Refill: 0  -     traMADoL (ULTRAM) 50 mg tablet; Take 2 tablets (100 mg total) by mouth 3 (three) times daily as needed for Pain (instead of oxycodone).  Dispense: 180 tablet; Refill: 0    Lumbar degenerative disc disease  -     GEORGE by IFA, w/Rflx; Future; Expected date: 04/17/2023  -     Comprehensive Metabolic Panel; Future; Expected date: 04/17/2023  -     C-Reactive Protein; Future; Expected date: 04/17/2023  -     C3 Complement; Future; Expected date: 04/17/2023  -     C4 Complement; Future; Expected date: 04/17/2023  -     CBC Auto Differential; Future; Expected date: 04/17/2023  -     Urinalysis Microscopic; Future; Expected date: 04/17/2023  -     Sedimentation rate; Future; Expected date: 04/17/2023  -     CK; Future; Expected date: 04/17/2023  -     Rheumatoid Factor; Future; Expected date: 04/17/2023  -     Discontinue: naproxen (NAPROSYN) 500 MG tablet; Take 1 tablet (500 mg total) by mouth 2 (two) times daily as needed (pain).  Dispense: 180 tablet; Refill: 0  -     Discontinue: traMADoL (ULTRAM) 50 mg tablet; Take 2 tablets (100 mg total) by mouth 3 (three) times daily as needed for Pain (instead of oxycodone).  Dispense: 180 tablet; Refill: 1  -     Discontinue: naproxen (NAPROSYN) 500 MG tablet; Take 1 tablet (500 mg total) by mouth 2 (two) times daily as  needed (pain).  Dispense: 180 tablet; Refill: 0  -     Discontinue: traMADoL (ULTRAM) 50 mg tablet; Take 2 tablets (100 mg total) by mouth 3 (three) times daily as needed for Pain (instead of oxycodone).  Dispense: 180 tablet; Refill: 0  -     naproxen (NAPROSYN) 500 MG tablet; Take 1 tablet (500 mg total) by mouth 2 (two) times daily as needed (pain).  Dispense: 180 tablet; Refill: 0  -     traMADoL (ULTRAM) 50 mg tablet; Take 2 tablets (100 mg total) by mouth 3 (three) times daily as needed for Pain (instead of oxycodone).  Dispense: 180 tablet; Refill: 0    Avascular necrosis of left humeral head  -     GEORGE by IFA, w/Rflx; Future; Expected date: 04/17/2023  -     Comprehensive Metabolic Panel; Future; Expected date: 04/17/2023  -     C-Reactive Protein; Future; Expected date: 04/17/2023  -     C3 Complement; Future; Expected date: 04/17/2023  -     C4 Complement; Future; Expected date: 04/17/2023  -     CBC Auto Differential; Future; Expected date: 04/17/2023  -     Urinalysis Microscopic; Future; Expected date: 04/17/2023  -     Sedimentation rate; Future; Expected date: 04/17/2023  -     CK; Future; Expected date: 04/17/2023  -     Rheumatoid Factor; Future; Expected date: 04/17/2023    Rotator cuff disorder, left  -     GEORGE by IFA, w/Rflx; Future; Expected date: 04/17/2023  -     Comprehensive Metabolic Panel; Future; Expected date: 04/17/2023  -     C-Reactive Protein; Future; Expected date: 04/17/2023  -     C3 Complement; Future; Expected date: 04/17/2023  -     C4 Complement; Future; Expected date: 04/17/2023  -     CBC Auto Differential; Future; Expected date: 04/17/2023  -     Urinalysis Microscopic; Future; Expected date: 04/17/2023  -     Sedimentation rate; Future; Expected date: 04/17/2023  -     CK; Future; Expected date: 04/17/2023  -     Rheumatoid Factor; Future; Expected date: 04/17/2023    Celiac disease  -     GEORGE by IFA, w/Rflx; Future; Expected date: 04/17/2023  -     Comprehensive Metabolic  Panel; Future; Expected date: 04/17/2023  -     C-Reactive Protein; Future; Expected date: 04/17/2023  -     C3 Complement; Future; Expected date: 04/17/2023  -     C4 Complement; Future; Expected date: 04/17/2023  -     CBC Auto Differential; Future; Expected date: 04/17/2023  -     Urinalysis Microscopic; Future; Expected date: 04/17/2023  -     Sedimentation rate; Future; Expected date: 04/17/2023  -     CK; Future; Expected date: 04/17/2023  -     Rheumatoid Factor; Future; Expected date: 04/17/2023    Myalgia  -     GEORGE by IFA, w/Rflx; Future; Expected date: 04/17/2023  -     Comprehensive Metabolic Panel; Future; Expected date: 04/17/2023  -     C-Reactive Protein; Future; Expected date: 04/17/2023  -     C3 Complement; Future; Expected date: 04/17/2023  -     C4 Complement; Future; Expected date: 04/17/2023  -     CBC Auto Differential; Future; Expected date: 04/17/2023  -     Urinalysis Microscopic; Future; Expected date: 04/17/2023  -     Sedimentation rate; Future; Expected date: 04/17/2023  -     CK; Future; Expected date: 04/17/2023  -     Rheumatoid Factor; Future; Expected date: 04/17/2023    Carpal tunnel syndrome of left wrist  -     GEORGE by IFA, w/Rflx; Future; Expected date: 04/17/2023  -     Comprehensive Metabolic Panel; Future; Expected date: 04/17/2023  -     C-Reactive Protein; Future; Expected date: 04/17/2023  -     C3 Complement; Future; Expected date: 04/17/2023  -     C4 Complement; Future; Expected date: 04/17/2023  -     CBC Auto Differential; Future; Expected date: 04/17/2023  -     Urinalysis Microscopic; Future; Expected date: 04/17/2023  -     Sedimentation rate; Future; Expected date: 04/17/2023  -     CK; Future; Expected date: 04/17/2023  -     Rheumatoid Factor; Future; Expected date: 04/17/2023      Follow up in about 3 months (around 4/30/2023).     Graham Zheng MD

## 2023-01-30 ENCOUNTER — OFFICE VISIT (OUTPATIENT)
Dept: RHEUMATOLOGY | Facility: CLINIC | Age: 63
End: 2023-01-30
Payer: COMMERCIAL

## 2023-01-30 VITALS
SYSTOLIC BLOOD PRESSURE: 135 MMHG | HEIGHT: 64 IN | DIASTOLIC BLOOD PRESSURE: 92 MMHG | BODY MASS INDEX: 22.77 KG/M2 | HEART RATE: 68 BPM | WEIGHT: 133.38 LBS | RESPIRATION RATE: 18 BRPM | OXYGEN SATURATION: 99 %

## 2023-01-30 DIAGNOSIS — M79.10 MYALGIA: ICD-10-CM

## 2023-01-30 DIAGNOSIS — G56.02 CARPAL TUNNEL SYNDROME OF LEFT WRIST: ICD-10-CM

## 2023-01-30 DIAGNOSIS — M67.912 ROTATOR CUFF DISORDER, LEFT: ICD-10-CM

## 2023-01-30 DIAGNOSIS — K90.0 CELIAC DISEASE: ICD-10-CM

## 2023-01-30 DIAGNOSIS — M51.36 LUMBAR DEGENERATIVE DISC DISEASE: ICD-10-CM

## 2023-01-30 DIAGNOSIS — M25.59 PAIN IN OTHER JOINT: Primary | ICD-10-CM

## 2023-01-30 DIAGNOSIS — M25.50 POLYARTHRALGIA: ICD-10-CM

## 2023-01-30 DIAGNOSIS — M87.022 AVASCULAR NECROSIS OF LEFT HUMERAL HEAD: ICD-10-CM

## 2023-01-30 DIAGNOSIS — M15.8 OTHER OSTEOARTHRITIS INVOLVING MULTIPLE JOINTS: ICD-10-CM

## 2023-01-30 PROCEDURE — 3008F BODY MASS INDEX DOCD: CPT | Mod: CPTII,S$GLB,, | Performed by: INTERNAL MEDICINE

## 2023-01-30 PROCEDURE — 1159F MED LIST DOCD IN RCRD: CPT | Mod: CPTII,S$GLB,, | Performed by: INTERNAL MEDICINE

## 2023-01-30 PROCEDURE — 1160F RVW MEDS BY RX/DR IN RCRD: CPT | Mod: CPTII,S$GLB,, | Performed by: INTERNAL MEDICINE

## 2023-01-30 PROCEDURE — 99214 OFFICE O/P EST MOD 30 MIN: CPT | Mod: S$GLB,,, | Performed by: INTERNAL MEDICINE

## 2023-01-30 PROCEDURE — 3080F DIAST BP >= 90 MM HG: CPT | Mod: CPTII,S$GLB,, | Performed by: INTERNAL MEDICINE

## 2023-01-30 PROCEDURE — 3080F PR MOST RECENT DIASTOLIC BLOOD PRESSURE >= 90 MM HG: ICD-10-PCS | Mod: CPTII,S$GLB,, | Performed by: INTERNAL MEDICINE

## 2023-01-30 PROCEDURE — 3075F PR MOST RECENT SYSTOLIC BLOOD PRESS GE 130-139MM HG: ICD-10-PCS | Mod: CPTII,S$GLB,, | Performed by: INTERNAL MEDICINE

## 2023-01-30 PROCEDURE — 1159F PR MEDICATION LIST DOCUMENTED IN MEDICAL RECORD: ICD-10-PCS | Mod: CPTII,S$GLB,, | Performed by: INTERNAL MEDICINE

## 2023-01-30 PROCEDURE — 3008F PR BODY MASS INDEX (BMI) DOCUMENTED: ICD-10-PCS | Mod: CPTII,S$GLB,, | Performed by: INTERNAL MEDICINE

## 2023-01-30 PROCEDURE — 99214 PR OFFICE/OUTPT VISIT, EST, LEVL IV, 30-39 MIN: ICD-10-PCS | Mod: S$GLB,,, | Performed by: INTERNAL MEDICINE

## 2023-01-30 PROCEDURE — 3075F SYST BP GE 130 - 139MM HG: CPT | Mod: CPTII,S$GLB,, | Performed by: INTERNAL MEDICINE

## 2023-01-30 PROCEDURE — 1160F PR REVIEW ALL MEDS BY PRESCRIBER/CLIN PHARMACIST DOCUMENTED: ICD-10-PCS | Mod: CPTII,S$GLB,, | Performed by: INTERNAL MEDICINE

## 2023-01-30 RX ORDER — NAPROXEN 500 MG/1
500 TABLET ORAL 2 TIMES DAILY PRN
Qty: 180 TABLET | Refills: 0 | Status: SHIPPED | OUTPATIENT
Start: 2023-01-30 | End: 2023-01-30 | Stop reason: SDUPTHER

## 2023-01-30 RX ORDER — TRAMADOL HYDROCHLORIDE 50 MG/1
100 TABLET ORAL 3 TIMES DAILY PRN
Qty: 180 TABLET | Refills: 0 | Status: SHIPPED | OUTPATIENT
Start: 2023-01-30 | End: 2023-04-11 | Stop reason: SDUPTHER

## 2023-01-30 RX ORDER — NAPROXEN 500 MG/1
500 TABLET ORAL 2 TIMES DAILY PRN
Qty: 180 TABLET | Refills: 0 | Status: SHIPPED | OUTPATIENT
Start: 2023-01-30 | End: 2023-01-30

## 2023-01-30 RX ORDER — TRAMADOL HYDROCHLORIDE 50 MG/1
100 TABLET ORAL 3 TIMES DAILY PRN
Qty: 180 TABLET | Refills: 0 | Status: SHIPPED | OUTPATIENT
Start: 2023-01-30 | End: 2023-01-30 | Stop reason: SDUPTHER

## 2023-01-30 RX ORDER — NAPROXEN 500 MG/1
500 TABLET ORAL 2 TIMES DAILY PRN
Qty: 180 TABLET | Refills: 0 | Status: SHIPPED | OUTPATIENT
Start: 2023-01-30 | End: 2023-04-25 | Stop reason: SDUPTHER

## 2023-01-30 RX ORDER — TRAMADOL HYDROCHLORIDE 50 MG/1
100 TABLET ORAL 3 TIMES DAILY PRN
Qty: 180 TABLET | Refills: 1 | Status: SHIPPED | OUTPATIENT
Start: 2023-01-30 | End: 2023-01-30

## 2023-04-11 DIAGNOSIS — M51.36 LUMBAR DEGENERATIVE DISC DISEASE: ICD-10-CM

## 2023-04-11 DIAGNOSIS — M15.8 OTHER OSTEOARTHRITIS INVOLVING MULTIPLE JOINTS: ICD-10-CM

## 2023-04-11 DIAGNOSIS — M25.50 POLYARTHRALGIA: ICD-10-CM

## 2023-04-11 DIAGNOSIS — M25.59 PAIN IN OTHER JOINT: ICD-10-CM

## 2023-04-11 RX ORDER — TRAMADOL HYDROCHLORIDE 50 MG/1
100 TABLET ORAL 3 TIMES DAILY PRN
Qty: 180 TABLET | Refills: 0 | Status: SHIPPED | OUTPATIENT
Start: 2023-04-11 | End: 2023-04-25 | Stop reason: SDUPTHER

## 2023-04-11 NOTE — PROGRESS NOTES
Subjective:      Patient ID: Kayode Merrill is a 63 y.o. male.    Chief Complaint: Disease Management    HPI:   Includes joint pain with subjective swelling.   Antecedent event includes: None;  Pain location includes: joints, hands, and feet;  Gradual onset; beginning >years ago;  Constant ache, Moderate in severity,   Lasting >minutes, Worse during the daytime;   Improved with rest, medication, change in position, and none;   Worsened with activity, overuse, stress, and rest;         Review of Systems   Constitutional:  Negative for chills, fatigue and fever.   HENT:  Positive for hearing loss. Negative for nasal congestion, ear pain, mouth dryness, mouth sores, nosebleeds and trouble swallowing.    Eyes:  Positive for visual disturbance. Negative for photophobia, pain, redness and eye dryness.   Respiratory:  Positive for shortness of breath. Negative for cough.    Cardiovascular:  Positive for palpitations. Negative for chest pain and leg swelling.   Gastrointestinal:  Negative for abdominal distention, abdominal pain, blood in stool, constipation, diarrhea, rectal pain, vomiting and fecal incontinence.   Endocrine: Negative for polydipsia and polyuria.   Genitourinary:  Negative for bladder incontinence, dysuria, enuresis, genital sores and hematuria.   Musculoskeletal:  Positive for arthralgias, joint swelling and myalgias.   Integumentary:  Positive for rash. Negative for wound and mole/lesion.   Neurological:  Positive for weakness. Negative for numbness and headaches.   Hematological:  Negative for adenopathy. Bruises/bleeds easily.   Psychiatric/Behavioral:  Positive for dysphoric mood and sleep disturbance. The patient is nervous/anxious.     Past Medical History:   Diagnosis Date    Acid reflux     Arthritis     Carpal tunnel syndrome     Celiac disease     Dry mouth     Heart murmur     Hyperlipidemia     Hypertension      Past Surgical History:   Procedure Laterality Date    COLONOSCOPY      EGD, WITH  "BALLOON DILATION      MOUTH SURGERY      ROTATOR CUFF REPAIR      TRIGGER FINGER RELEASE        See the Assessment/Plan for further characterization of the HPI, ROS, Medical, Surgical, Family, and Social Histories including Tobacco use, Meds; all of which has been reviewed in Epic.    Medication List with Changes/Refills   Current Medications    AMLODIPINE (NORVASC) 10 MG TABLET        DICLOFENAC SODIUM (VOLTAREN) 1 % GEL        LISINOPRIL (PRINIVIL,ZESTRIL) 40 MG TABLET        MECLIZINE (ANTIVERT) 25 MG TABLET        METHOCARBAMOL (ROBAXIN) 500 MG TAB        OMEPRAZOLE (PRILOSEC) 40 MG CAPSULE    Take 40 mg by mouth once daily.    ROPINIROLE (REQUIP) 0.5 MG TABLET    ropinirole 0.5 mg tablet   Take 1 tablet 3 times a day by oral route.    ROSUVASTATIN (CRESTOR) 20 MG TABLET    TAKE 1 TABLET BY MOUTH ONCE A DAY STOP PRAVASTATIN 20MG   Changed and/or Refilled Medications    Modified Medication Previous Medication    NAPROXEN (NAPROSYN) 500 MG TABLET naproxen (NAPROSYN) 500 MG tablet       Take 1 tablet (500 mg total) by mouth 2 (two) times daily as needed (pain).    Take 1 tablet (500 mg total) by mouth 2 (two) times daily as needed (pain).    TRAMADOL (ULTRAM) 50 MG TABLET traMADoL (ULTRAM) 50 mg tablet       Take 2 tablets (100 mg total) by mouth 3 (three) times daily as needed for Pain (instead of oxycodone).    Take 2 tablets (100 mg total) by mouth 3 (three) times daily as needed for Pain (instead of oxycodone).   Discontinued Medications    PRAVASTATIN (PRAVACHOL) 20 MG TABLET             Objective:   /75   Pulse 74   Resp 18   Ht 5' 4" (1.626 m)   Wt 60.6 kg (133 lb 8 oz)   SpO2 99%   BMI 22.92 kg/m²   Physical Exam  Non-toxic appearance. No distress.   Normocephalic and atraumatic.   External ears normal.    Conjunctivae and EOM are normal. No scleral icterus.   RRR, No friction rub; palpable distal pulses.    No tachypnea or signs of respiratory distress.   Abdominal: No guarding or rebound " tenderness.   Neurological: Oriented. Normal thought content.   Skin is warm. No pallor.   Musculoskeletal: see below for further input.     No image results found.    No visits with results within 1 Year(s) from this visit.   Latest known visit with results is:   No results found for any previous visit.        All of the data above and below has been reviewed by myself and any further interpretations will be reflected in the assessment and plan.   The data includes review of external notes, and independent interpretation of lab results, x-rays, and imaging reports.  Active inflammatory arthritis is an illness that poses a threat to bodily function and even a threat to life in some cases.  Drug therapy to treat inflammatory arthritis usually requires intensive monitoring for toxicity.    Review of patient's allergies indicates:  No Known Allergies  Assessment/Plan:          Prev noted/prior plan:   (No overt synovitis  DJD generalized is impressive on exam  Heart murmur     Verbal education including long discussion of what is causing his pain, what else we are looking into, and that there won't be a medicine that will mask the pain any more than the prior narcotics oxycodone 10mg tid he had been on     He should consider also working with Pain Management if needing pain management with high dose narcotics     He tells me he had to wait to long in waiting room to see Dr. Krueger is why he left     I can resume tramadol 100mg tid prn moderate pain for now     He should also resume  Naprosyn 500mg bid prn unless there a reason he cannot use?     Blood work to further evaluate     Revisit 4 weeks     Contact us prn)     Visit prior to Visit prior to Visit prior to Last visit:     Interim lab 1/13/22:          C3 97  C4 18     SPEP alpha 2 little low 0.52  Ig levels normal     WBC 4.8; Hgb 15.4; plt 324     Creat 0.74; alb 4.6     ESR 5 CRP <3     GEORGE neg RF neg CCP neg HLAB27 neg HepBsAgsAbnegcoreAb neg HCV neg  uric acid 7     2022 TB negative        tramadol 100mg tid prn moderate pain for now     He should also resume  Naprosyn 500mg bid prn unless there a reason he cannot use? Prev noted     He has interim symptoms of inflammatory arthritis     I see that he also got some interim hydrocodone noted     He may be working with a different Orthopedist now;   He tells me he had declined further management including possible knee surgery with Dr. Diego, but      since working with a new doctor     He had a left shoulder injection and may have a carpal tunnel injection in the future     No overt synovitis  DJD impressive including hands and knees   Rotator cuff     Went over lab including RF neg     I agree with most of Dr. Krueger's impression     I am not seeing active inflammatory arthritis or connective tissue disease on exam or lab noted     Verbal education     We talked and mostly listened for while today; I feel for him and I am impressed he has been able to wean the narcotics     He does have a lot of Osteoarthritis; I don't see active inflammatory arthritis     I am okay with giving him the tramadol with caveats     tramadol 100mg tid prn moderate pain for now     It sounds like he is also working with Orthopedics;   He is not sure but the doctor did give him a shoulder injection noted     xrays to further evaluate     Revisit 2 months with lab 2 weeks prior     Contact us prn     Visit prior to Visit prior to Last visit:     Interim lab 3/15/22:    Creat 0.64; alb 4.8     WBC 4.8; Hgb 15; plt 327     UA SG 1.015 rare cell trace ketones        ESR<1; CRP <3        Interim xray reports: he held off due to cost but should proceed in future when able     Naprosyn 500mg bid prn      tramadol 100mg tid prn moderate pain for now     He has some interim symptoms inflammatory arthritis also symptoms of carpal tunnel     He tells me he has also been working with Orthopedics and a hand specialist and may have left  shoulder surgery in the future     He also had some xrays with them and he will plan to have them forward to us and we will seek if not sent next visit  He should also get the xrays at his convenience     He has made some dietary changes trying to be more gluten free which is great     He tells me he also went to ED interim for pulled muscle in stomach after lifting something heavy that is better noted     No overt synovitis  DJD is impressive in hands on exam  Rotator cuff     Went over interim lab      I am not seeing active inflammatory arthritis or connective tissue disease on exam or lab noted.     We will observe otherwise and he will plan to call if joint swelling recurs;      Xray when able and send us other     Refilled naprosyn and tramadol     Revisit 6 months with lab 2 weeks prior        Contact us prn     Visit prior to Last visit:     Interim lab 9/19/22:          Creat 0.71 alb 4.8     WBC 4.3; Hgb 16.7; plt 298     UA SG 1.01 rare cell     ESR<1 CRP <3        Interim Xray when able and      Interim prior xray reports:               Prior plan: (Interim xray reports: he held off due to cost but should proceed in future when able   Naprosyn 500mg bid prn    tramadol 100mg tid prn moderate pain for now   He has some interim symptoms inflammatory arthritis also symptoms of carpal tunnel   He tells me he has also been working with Orthopedics and a hand specialist and may have left shoulder surgery in the future   He also had some xrays with them and he will plan to have them forward to us and we will seek if not sent next visit  He should also get the xrays at his convenience  He has made some dietary changes trying to be more gluten free which is great  He tells me he also went to ED interim for pulled muscle in stomach after lifting something heavy that is better noted  No overt synovitis  DJD is impressive in hands on exam  Rotator cuff  I am not seeing active inflammatory arthritis or  connective tissue disease on exam or lab noted.   We will observe otherwise and he will plan to call if joint swelling recurs;   Refilled naprosyn and tramadol)        He has some interim symptoms of inflammatory arthritis; other problems on ROS he will also plan to update other caregivers if not already     He held off getting the xrays again noted, but a bit back and forth he thinks he may have done them:   he tells me he rather had the xrays with Dr. Minor and we will seek those results        I see he is also getting some hydrocodone from [facility] in ? Yes he tells me he had left shoulder surgery with Dr. Minor a few months ago that went fairly well     He continues to have intermittent symptoms of inflammatory arthritis ongoing     He may have thumb surgery with Dr. Fung in the future noted        No overt synovitis  DJD is impressive in hands on exam  Rotator cuff     Verbal education     Went over lab      I am not seeing active inflammatory arthritis or connective tissue disease on exam or lab noted. He will plan to call if recurs     Cont current  See prior plan     We will seek his xray reports from Dr. Minor and if he did not have ours; he can proceed with getting at his convenience        Revisit lab 2 weeks prior     Contact us prn      Last visit:     Interim lab 1/18/23:     WBC 6.2; Hgb 14.7; plt 308     Creat 0.69; alb 4.1          UA rare cell SG 1.02        Uric acid 5.7        ESR 3 CRP<2.9     Interim xray reports:        seek his xray reports from Dr. 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           Prior plan: (Interim xray reports: he held off due to  cost but should proceed in future when able   Naprosyn 500mg bid prn    tramadol 100mg tid prn moderate pain for now   He has some interim symptoms inflammatory arthritis also symptoms of carpal tunnel   He tells me he has also been working with Orthopedics and a hand specialist and may have left shoulder surgery in the future   He also had some xrays with them and he will plan to have them forward to us and we will seek if not sent next visit  He should also get the xrays at his convenience  He has made some dietary changes trying to be more gluten free which is great  He tells me he also went to ED interim for pulled muscle in stomach after lifting something heavy that is better noted  No overt synovitis  DJD is impressive in hands on exam  Rotator cuff  I am not seeing active inflammatory arthritis or connective tissue disease on exam or lab noted.   We will observe otherwise and he will plan to call if joint swelling recurs;   Refilled naprosyn and tramadol)  Prev noted/prior plan:    (He has some interim symptoms of inflammatory arthritis; other problems on ROS he will also plan to update other caregivers if not already     He held off getting the xrays again noted, but a bit back and forth he thinks he may have done them:   he tells me he rather had the xrays with Dr. Minor and we will seek those results     I see he is also getting some hydrocodone from [facility] in ? Yes he tells me he had left shoulder surgery with Dr. Minor a few months ago that went fairly well     He continues to have intermittent symptoms of inflammatory arthritis ongoing     He may have thumb surgery with Dr. Fung in the future noted     No overt synovitis  DJD is impressive in hands on exam  Left knee deformity  Rotator cuff     Verbal education     Went over lab    I am not seeing active inflammatory arthritis or connective tissue disease on exam or lab noted. He will plan to call if recurs   Cont current  See prior plan    We will seek his xray reports from Dr. Minor and if he did not have ours; he can proceed with getting at his convenience)        He has some interim symptoms of inflammatory arthritis     He also has left knee deformity he is to get MRI left knee working with Orthopedics     He rather clarifies he did have his xrays (that I ordered) at [facility] and we will seek those results        No overt synovitis  DJD is impressive in hands on exam  Rotator cuff  Left knee deformity     Verbal education     Went over interim lab and prior imaging reports: We will seek his xray reports (that I had ordered)        See prior           Revisit lab 2 weeks prior     Contact us prn     This visit:    Interim lab 4/13/23:    WBC 5.5; Hgb 13.8; plt 307    Creat 0.68; alb 4.1    GEORGE neg RF neg    C3 85    C4 22        UA rare cell    ESR 5 CRP<2.9    Interim xrays:    Prior plan: (Interim xray reports: he held off due to cost but should proceed in future when able   Naprosyn 500mg bid prn    tramadol 100mg tid prn moderate pain for now   He has some interim symptoms inflammatory arthritis also symptoms of carpal tunnel   He tells me he has also been working with Orthopedics and a hand specialist and may have left shoulder surgery in the future   He also had some xrays with them and he will plan to have them forward to us and we will seek if not sent next visit  He should also get the xrays at his convenience  He has made some dietary changes trying to be more gluten free which is great  He tells me he also went to ED interim for pulled muscle in stomach after lifting something heavy that is better noted  No overt synovitis  DJD is impressive in hands on exam  Rotator cuff  I am not seeing active inflammatory arthritis or connective tissue disease on exam or lab noted.   We will observe otherwise and he will plan to call if joint swelling recurs;   Refilled naprosyn and tramadol)  Prev noted/prior plan:    (He has some  interim symptoms of inflammatory arthritis; other problems on ROS he will also plan to update other caregivers if not already     He held off getting the xrays again noted, but a bit back and forth he thinks he may have done them:   he tells me he rather had the xrays with Dr. Minor and we will seek those results     I see he is also getting some hydrocodone from [facility] in ? Yes he tells me he had left shoulder surgery with Dr. Minor a few months ago that went fairly well     He continues to have intermittent symptoms of inflammatory arthritis ongoing     He may have thumb surgery with Dr. Fung in the future noted     No overt synovitis  DJD is impressive in hands on exam  Left knee deformity  Rotator cuff     Verbal education     Went over lab    I am not seeing active inflammatory arthritis or connective tissue disease on exam or lab noted. He will plan to call if recurs   Cont current  See prior plan   We will seek his xray reports from Dr. Minor and if he did not have ours; he can proceed with getting at his convenience)     Prev noted/prior plan:   (He has some interim symptoms of inflammatory arthritis     He also has left knee deformity he is to get MRI left knee working with Orthopedics     He rather clarifies he did have his xrays (that I ordered) at [facility] and we will seek those results      No overt synovitis  DJD is impressive in hands on exam  Rotator cuff  Left knee deformity     Verbal education     Went over interim lab and prior imaging reports: We will seek his xray reports (that I had ordered)   See prior)     His prescriptions are failing to send to his pharmacy and I am printing them so Marlin can fax them prev noted     Addendum 2/27/23: He brought [facility] MRI report left knee 2/16/23 and looks like second page is missing? but: Impression: tear medial meniscus, tear anterior horn lateral meniscus; paralabral cyst or synovial cyst mild joint effusion with small baker's  cyst; tricompartmental full thickness cartilage defects most pronounced       We will seek his xrays if he had them; he did not     I see he did have interim MRI left knee; yes and was told he may benefit from knee surgery but he is holding off    He has some interim symptoms of inflammatory arthritis    Also interim rash he tells me Dermatology says it is purpura and gave a medicine that does not help     He is warming up to getting the xrays which will be helpful      No overt synovitis  DJD is impressive in hands on exam  Rotator cuff  Left knee deformity    Went over lab and records    I am not seeing active inflammatory arthritis on exam or lab but some history suspicious in addition to his mechanical issues noted.    History celiac disease noted that he is attributing a lot of his symptoms to    Tells me had knee effusion drained and sent for studies    We will seek results of the synovial fluid results    We will seek the rest of the MRI left knee report that was sent    We will seek records from Dermatology most recent note    Xrays at his convenience    Naprosyn 500mg bid prn    tramadol 100mg tid prn moderate pain for now     Revisit lab 2 weeks prior     Contact us prn    GEORGE neg CCP neg  GEORGE neg repeat GEORGE neg RF neg repeat RF neg CCP neg HLAB27 neg HepBsAgsAbnegcoreAb neg HCV neg uric acid 7 repeat 5.7     2022 TB negative     There is some history to suggest inflammatory arthritis.     Celiac disease per records from PCP     There is also some mechanical and neurogenic pain.     Recently found to have left Carpal tunnel on EMG/NCS 12/13/21 he brought; he can update Orthopedist if not already prev noted     Right ring finger surgery  Right shoulder surgery Dr. Diego     Severe DJD in knees left worse than right    2/16/23 MRI left knee and looks like second page is missing but: Impression: tear medial meniscus, tear anterior horn lateral meniscus; paralabral cyst or synovial cyst mild joint effusion  with small baker's cyst; tricompartmental full thickness cartilage defects most pronounced      3/11/22 CT left shoulder: severe DJD glenohumeral joint bone on bone with subchondral cystic and sclerosis  3/17/22: MRI left shoulder: moderate tendinosis supraspinatus and infraspinatus; severe glenohumeral DJD with extensive bone infarct/osteonecrosis proximal humerus    Has been managing with:     Naprosyn 500mg bid he tells me he rather does not use     Robaxin he tells me he rather does not use and plan to clarify his med list with Tori Hamlin gel     Prior oxycodone 10mg tid that he was still getting up until a couple of months ago     Prev noted/prior plan:   (No overt synovitis  DJD generalized is impressive on exam  Heart murmur     Verbal education including long discussion of what is causing his pain, what else we are looking into, and that there won't be a medicine that will mask the pain any more than the prior narcotics oxycodone 10mg tid he had been on     He should consider also working with Pain Management if needing pain management with high dose narcotics   He tells me he had to wait to long in waiting room to see Dr. Krueger is why he left   I can resume tramadol 100mg tid prn moderate pain for now   He should also resume  Naprosyn 500mg bid prn unless there a reason he cannot use?   Blood work to further evaluate   Revisit 4 weeks   Contact us prn)     Has been working with:     Celiac disease followed by GI     Aortic stenosis Dr. [facility]      Records Dr. Krueger some of note 5/26/21: OA shoulder; lumbar spine pain; failed NSAIDs; Opiates help ADL; improvement with steroid injection; A/P: RF+ mild; does not suggest active rheumatoid arthritis; OA; DJD hips, hands, spine; Oxycodone 10mg tid; Lab; revisit 1 month     8/6/21 xray reports: right shoulder severe DJD post op changes; severe DJD left knee worse than right 3 compartment       7/31/20 CT abd/pelvis: mild fatty infiltration  liver; cysts versus hemangiomas 1.1 cm left hepatic lobe; thick sigmoid colon may be secondary to prior colitis; recommend MRI multiphase to further evaluate     Review of medical records as stated above.  Lab +/- imaging and other ordered diagnostic studies to further evaluate.  See the orders associated with this note visit.  Medications as prescribed as tolerated.    Education including verbal and those noted in the patient instructions.  Revisit as scheduled and call prn.    The following diagnoses influence medical decision making and/or need further workup including the orders listed below:    Pain in other joint  -     GEORGE by IFA, w/Rflx; Future; Expected date: 06/12/2023  -     Comprehensive Metabolic Panel; Future; Expected date: 06/12/2023  -     C-Reactive Protein; Future; Expected date: 06/12/2023  -     C3 Complement; Future; Expected date: 06/12/2023  -     C4 Complement; Future; Expected date: 06/12/2023  -     CBC Auto Differential; Future; Expected date: 06/12/2023  -     Urinalysis Microscopic; Future; Expected date: 06/12/2023  -     Sedimentation rate; Future; Expected date: 06/12/2023  -     CK; Future; Expected date: 06/12/2023  -     X-Ray Hand 3 View Bilateral; Future; Expected date: 04/25/2023  -     X-Ray Hips Bilateral 2 View Inc AP Pelvis; Future; Expected date: 04/25/2023  -     X-Ray Chest PA And Lateral; Future; Expected date: 04/25/2023  -     X-Ray Cervical Spine AP And Lateral; Future; Expected date: 04/25/2023  -     X-ray AP Standing Knees with Both Latera; Future; Expected date: 04/25/2023  -     X-Ray Foot 2 View Bilateral; Future; Expected date: 04/25/2023  -     traMADoL (ULTRAM) 50 mg tablet; Take 2 tablets (100 mg total) by mouth 3 (three) times daily as needed for Pain (instead of oxycodone).  Dispense: 180 tablet; Refill: 0  -     naproxen (NAPROSYN) 500 MG tablet; Take 1 tablet (500 mg total) by mouth 2 (two) times daily as needed (pain).  Dispense: 180 tablet; Refill:  0    Polyarthralgia  -     GEORGE by IFA, w/Rflx; Future; Expected date: 06/12/2023  -     Comprehensive Metabolic Panel; Future; Expected date: 06/12/2023  -     C-Reactive Protein; Future; Expected date: 06/12/2023  -     C3 Complement; Future; Expected date: 06/12/2023  -     C4 Complement; Future; Expected date: 06/12/2023  -     CBC Auto Differential; Future; Expected date: 06/12/2023  -     Urinalysis Microscopic; Future; Expected date: 06/12/2023  -     Sedimentation rate; Future; Expected date: 06/12/2023  -     CK; Future; Expected date: 06/12/2023  -     X-Ray Hand 3 View Bilateral; Future; Expected date: 04/25/2023  -     X-Ray Hips Bilateral 2 View Inc AP Pelvis; Future; Expected date: 04/25/2023  -     X-Ray Chest PA And Lateral; Future; Expected date: 04/25/2023  -     X-Ray Cervical Spine AP And Lateral; Future; Expected date: 04/25/2023  -     X-ray AP Standing Knees with Both Latera; Future; Expected date: 04/25/2023  -     X-Ray Foot 2 View Bilateral; Future; Expected date: 04/25/2023  -     traMADoL (ULTRAM) 50 mg tablet; Take 2 tablets (100 mg total) by mouth 3 (three) times daily as needed for Pain (instead of oxycodone).  Dispense: 180 tablet; Refill: 0  -     naproxen (NAPROSYN) 500 MG tablet; Take 1 tablet (500 mg total) by mouth 2 (two) times daily as needed (pain).  Dispense: 180 tablet; Refill: 0    Other osteoarthritis involving multiple joints  -     GEORGE by IFA, w/Rflx; Future; Expected date: 06/12/2023  -     Comprehensive Metabolic Panel; Future; Expected date: 06/12/2023  -     C-Reactive Protein; Future; Expected date: 06/12/2023  -     C3 Complement; Future; Expected date: 06/12/2023  -     C4 Complement; Future; Expected date: 06/12/2023  -     CBC Auto Differential; Future; Expected date: 06/12/2023  -     Urinalysis Microscopic; Future; Expected date: 06/12/2023  -     Sedimentation rate; Future; Expected date: 06/12/2023  -     CK; Future; Expected date: 06/12/2023  -     X-Ray  Hand 3 View Bilateral; Future; Expected date: 04/25/2023  -     X-Ray Hips Bilateral 2 View Inc AP Pelvis; Future; Expected date: 04/25/2023  -     X-Ray Chest PA And Lateral; Future; Expected date: 04/25/2023  -     X-Ray Cervical Spine AP And Lateral; Future; Expected date: 04/25/2023  -     X-ray AP Standing Knees with Both Latera; Future; Expected date: 04/25/2023  -     X-Ray Foot 2 View Bilateral; Future; Expected date: 04/25/2023  -     traMADoL (ULTRAM) 50 mg tablet; Take 2 tablets (100 mg total) by mouth 3 (three) times daily as needed for Pain (instead of oxycodone).  Dispense: 180 tablet; Refill: 0  -     naproxen (NAPROSYN) 500 MG tablet; Take 1 tablet (500 mg total) by mouth 2 (two) times daily as needed (pain).  Dispense: 180 tablet; Refill: 0    Lumbar degenerative disc disease  -     GEORGE by IFA, w/Rflx; Future; Expected date: 06/12/2023  -     Comprehensive Metabolic Panel; Future; Expected date: 06/12/2023  -     C-Reactive Protein; Future; Expected date: 06/12/2023  -     C3 Complement; Future; Expected date: 06/12/2023  -     C4 Complement; Future; Expected date: 06/12/2023  -     CBC Auto Differential; Future; Expected date: 06/12/2023  -     Urinalysis Microscopic; Future; Expected date: 06/12/2023  -     Sedimentation rate; Future; Expected date: 06/12/2023  -     CK; Future; Expected date: 06/12/2023  -     traMADoL (ULTRAM) 50 mg tablet; Take 2 tablets (100 mg total) by mouth 3 (three) times daily as needed for Pain (instead of oxycodone).  Dispense: 180 tablet; Refill: 0  -     naproxen (NAPROSYN) 500 MG tablet; Take 1 tablet (500 mg total) by mouth 2 (two) times daily as needed (pain).  Dispense: 180 tablet; Refill: 0    Avascular necrosis of left humeral head  -     GEORGE by IFA, w/Rflx; Future; Expected date: 06/12/2023  -     Comprehensive Metabolic Panel; Future; Expected date: 06/12/2023  -     C-Reactive Protein; Future; Expected date: 06/12/2023  -     C3 Complement; Future; Expected  date: 06/12/2023  -     C4 Complement; Future; Expected date: 06/12/2023  -     CBC Auto Differential; Future; Expected date: 06/12/2023  -     Urinalysis Microscopic; Future; Expected date: 06/12/2023  -     Sedimentation rate; Future; Expected date: 06/12/2023  -     CK; Future; Expected date: 06/12/2023  -     X-Ray Chest PA And Lateral; Future; Expected date: 04/25/2023    Rotator cuff disorder, left  -     GEORGE by IFA, w/Rflx; Future; Expected date: 06/12/2023  -     Comprehensive Metabolic Panel; Future; Expected date: 06/12/2023  -     C-Reactive Protein; Future; Expected date: 06/12/2023  -     C3 Complement; Future; Expected date: 06/12/2023  -     C4 Complement; Future; Expected date: 06/12/2023  -     CBC Auto Differential; Future; Expected date: 06/12/2023  -     Urinalysis Microscopic; Future; Expected date: 06/12/2023  -     Sedimentation rate; Future; Expected date: 06/12/2023  -     CK; Future; Expected date: 06/12/2023    Celiac disease  -     GEORGE by IFA, w/Rflx; Future; Expected date: 06/12/2023  -     Comprehensive Metabolic Panel; Future; Expected date: 06/12/2023  -     C-Reactive Protein; Future; Expected date: 06/12/2023  -     C3 Complement; Future; Expected date: 06/12/2023  -     C4 Complement; Future; Expected date: 06/12/2023  -     CBC Auto Differential; Future; Expected date: 06/12/2023  -     Urinalysis Microscopic; Future; Expected date: 06/12/2023  -     Sedimentation rate; Future; Expected date: 06/12/2023  -     CK; Future; Expected date: 06/12/2023    Myalgia  -     GEORGE by IFA, w/Rflx; Future; Expected date: 06/12/2023  -     Comprehensive Metabolic Panel; Future; Expected date: 06/12/2023  -     C-Reactive Protein; Future; Expected date: 06/12/2023  -     C3 Complement; Future; Expected date: 06/12/2023  -     C4 Complement; Future; Expected date: 06/12/2023  -     CBC Auto Differential; Future; Expected date: 06/12/2023  -     Urinalysis Microscopic; Future; Expected date:  06/12/2023  -     Sedimentation rate; Future; Expected date: 06/12/2023  -     CK; Future; Expected date: 06/12/2023    Carpal tunnel syndrome of left wrist  -     GEORGE by AMADEO w/Rflx; Future; Expected date: 06/12/2023  -     Comprehensive Metabolic Panel; Future; Expected date: 06/12/2023  -     C-Reactive Protein; Future; Expected date: 06/12/2023  -     C3 Complement; Future; Expected date: 06/12/2023  -     C4 Complement; Future; Expected date: 06/12/2023  -     CBC Auto Differential; Future; Expected date: 06/12/2023  -     Urinalysis Microscopic; Future; Expected date: 06/12/2023  -     Sedimentation rate; Future; Expected date: 06/12/2023  -     CK; Future; Expected date: 06/12/2023    Screening for rheumatic disorder  -     GEORGE by AMADEO w/Rflx; Future; Expected date: 06/12/2023  -     Comprehensive Metabolic Panel; Future; Expected date: 06/12/2023  -     C-Reactive Protein; Future; Expected date: 06/12/2023  -     C3 Complement; Future; Expected date: 06/12/2023  -     C4 Complement; Future; Expected date: 06/12/2023  -     CBC Auto Differential; Future; Expected date: 06/12/2023  -     Urinalysis Microscopic; Future; Expected date: 06/12/2023  -     Sedimentation rate; Future; Expected date: 06/12/2023  -     CK; Future; Expected date: 06/12/2023  -     X-Ray Hand 3 View Bilateral; Future; Expected date: 04/25/2023  -     X-Ray Hips Bilateral 2 View Inc AP Pelvis; Future; Expected date: 04/25/2023  -     X-Ray Chest PA And Lateral; Future; Expected date: 04/25/2023  -     X-Ray Cervical Spine AP And Lateral; Future; Expected date: 04/25/2023  -     X-ray AP Standing Knees with Both Latera; Future; Expected date: 04/25/2023  -     X-Ray Foot 2 View Bilateral; Future; Expected date: 04/25/2023    Medication monitoring encounter  -     GEORGE by AMADEO w/Rflx; Future; Expected date: 06/12/2023  -     Comprehensive Metabolic Panel; Future; Expected date: 06/12/2023  -     C-Reactive Protein; Future; Expected date:  06/12/2023  -     C3 Complement; Future; Expected date: 06/12/2023  -     C4 Complement; Future; Expected date: 06/12/2023  -     CBC Auto Differential; Future; Expected date: 06/12/2023  -     Urinalysis Microscopic; Future; Expected date: 06/12/2023  -     Sedimentation rate; Future; Expected date: 06/12/2023  -     CK; Future; Expected date: 06/12/2023  -     X-Ray Chest PA And Lateral; Future; Expected date: 04/25/2023      Follow up in about 2 months (around 6/25/2023).     Graham Zheng MD

## 2023-04-25 ENCOUNTER — OFFICE VISIT (OUTPATIENT)
Dept: RHEUMATOLOGY | Facility: CLINIC | Age: 63
End: 2023-04-25
Payer: COMMERCIAL

## 2023-04-25 VITALS
WEIGHT: 133.5 LBS | HEIGHT: 64 IN | HEART RATE: 74 BPM | DIASTOLIC BLOOD PRESSURE: 75 MMHG | RESPIRATION RATE: 18 BRPM | SYSTOLIC BLOOD PRESSURE: 112 MMHG | OXYGEN SATURATION: 99 % | BODY MASS INDEX: 22.79 KG/M2

## 2023-04-25 DIAGNOSIS — G56.02 CARPAL TUNNEL SYNDROME OF LEFT WRIST: ICD-10-CM

## 2023-04-25 DIAGNOSIS — M25.50 POLYARTHRALGIA: ICD-10-CM

## 2023-04-25 DIAGNOSIS — Z13.89 SCREENING FOR RHEUMATIC DISORDER: ICD-10-CM

## 2023-04-25 DIAGNOSIS — M79.10 MYALGIA: ICD-10-CM

## 2023-04-25 DIAGNOSIS — M25.59 PAIN IN OTHER JOINT: Primary | ICD-10-CM

## 2023-04-25 DIAGNOSIS — M51.36 LUMBAR DEGENERATIVE DISC DISEASE: ICD-10-CM

## 2023-04-25 DIAGNOSIS — K90.0 CELIAC DISEASE: ICD-10-CM

## 2023-04-25 DIAGNOSIS — Z51.81 MEDICATION MONITORING ENCOUNTER: ICD-10-CM

## 2023-04-25 DIAGNOSIS — M15.8 OTHER OSTEOARTHRITIS INVOLVING MULTIPLE JOINTS: ICD-10-CM

## 2023-04-25 DIAGNOSIS — M87.022 AVASCULAR NECROSIS OF LEFT HUMERAL HEAD: ICD-10-CM

## 2023-04-25 DIAGNOSIS — M67.912 ROTATOR CUFF DISORDER, LEFT: ICD-10-CM

## 2023-04-25 PROCEDURE — 3008F PR BODY MASS INDEX (BMI) DOCUMENTED: ICD-10-PCS | Mod: CPTII,S$GLB,, | Performed by: INTERNAL MEDICINE

## 2023-04-25 PROCEDURE — 1159F PR MEDICATION LIST DOCUMENTED IN MEDICAL RECORD: ICD-10-PCS | Mod: CPTII,S$GLB,, | Performed by: INTERNAL MEDICINE

## 2023-04-25 PROCEDURE — 4010F ACE/ARB THERAPY RXD/TAKEN: CPT | Mod: CPTII,S$GLB,, | Performed by: INTERNAL MEDICINE

## 2023-04-25 PROCEDURE — 3074F PR MOST RECENT SYSTOLIC BLOOD PRESSURE < 130 MM HG: ICD-10-PCS | Mod: CPTII,S$GLB,, | Performed by: INTERNAL MEDICINE

## 2023-04-25 PROCEDURE — 99214 PR OFFICE/OUTPT VISIT, EST, LEVL IV, 30-39 MIN: ICD-10-PCS | Mod: S$GLB,,, | Performed by: INTERNAL MEDICINE

## 2023-04-25 PROCEDURE — 3078F PR MOST RECENT DIASTOLIC BLOOD PRESSURE < 80 MM HG: ICD-10-PCS | Mod: CPTII,S$GLB,, | Performed by: INTERNAL MEDICINE

## 2023-04-25 PROCEDURE — 3008F BODY MASS INDEX DOCD: CPT | Mod: CPTII,S$GLB,, | Performed by: INTERNAL MEDICINE

## 2023-04-25 PROCEDURE — 1160F PR REVIEW ALL MEDS BY PRESCRIBER/CLIN PHARMACIST DOCUMENTED: ICD-10-PCS | Mod: CPTII,S$GLB,, | Performed by: INTERNAL MEDICINE

## 2023-04-25 PROCEDURE — 99214 OFFICE O/P EST MOD 30 MIN: CPT | Mod: S$GLB,,, | Performed by: INTERNAL MEDICINE

## 2023-04-25 PROCEDURE — 3074F SYST BP LT 130 MM HG: CPT | Mod: CPTII,S$GLB,, | Performed by: INTERNAL MEDICINE

## 2023-04-25 PROCEDURE — 1159F MED LIST DOCD IN RCRD: CPT | Mod: CPTII,S$GLB,, | Performed by: INTERNAL MEDICINE

## 2023-04-25 PROCEDURE — 3078F DIAST BP <80 MM HG: CPT | Mod: CPTII,S$GLB,, | Performed by: INTERNAL MEDICINE

## 2023-04-25 PROCEDURE — 4010F PR ACE/ARB THEARPY RXD/TAKEN: ICD-10-PCS | Mod: CPTII,S$GLB,, | Performed by: INTERNAL MEDICINE

## 2023-04-25 PROCEDURE — 1160F RVW MEDS BY RX/DR IN RCRD: CPT | Mod: CPTII,S$GLB,, | Performed by: INTERNAL MEDICINE

## 2023-04-25 RX ORDER — ROSUVASTATIN CALCIUM 20 MG/1
TABLET, COATED ORAL
COMMUNITY
Start: 2023-03-06

## 2023-04-25 RX ORDER — TRAMADOL HYDROCHLORIDE 50 MG/1
100 TABLET ORAL 3 TIMES DAILY PRN
Qty: 180 TABLET | Refills: 0 | Status: SHIPPED | OUTPATIENT
Start: 2023-04-25

## 2023-04-25 RX ORDER — NAPROXEN 500 MG/1
500 TABLET ORAL 2 TIMES DAILY PRN
Qty: 180 TABLET | Refills: 0 | Status: SHIPPED | OUTPATIENT
Start: 2023-04-25